# Patient Record
Sex: FEMALE | Race: BLACK OR AFRICAN AMERICAN | Employment: FULL TIME | URBAN - METROPOLITAN AREA
[De-identification: names, ages, dates, MRNs, and addresses within clinical notes are randomized per-mention and may not be internally consistent; named-entity substitution may affect disease eponyms.]

---
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
## 2021-07-26 ENCOUNTER — HOSPITAL ENCOUNTER (EMERGENCY)
Age: 21
Discharge: PSYCHIATRIC OR SUBSTANCE USE FACILITY | End: 2021-07-26
Attending: EMERGENCY MEDICINE
Payer: COMMERCIAL

## 2021-07-26 ENCOUNTER — HOSPITAL ENCOUNTER (OUTPATIENT)
Age: 21
Setting detail: OBSERVATION
Discharge: HOME | End: 2021-07-27
Attending: PSYCHIATRY & NEUROLOGY | Admitting: PSYCHIATRY & NEUROLOGY
Payer: COMMERCIAL

## 2021-07-26 VITALS
SYSTOLIC BLOOD PRESSURE: 109 MMHG | OXYGEN SATURATION: 100 % | TEMPERATURE: 98 F | RESPIRATION RATE: 16 BRPM | DIASTOLIC BLOOD PRESSURE: 56 MMHG | HEART RATE: 63 BPM

## 2021-07-26 DIAGNOSIS — R45.851 SUICIDAL IDEATION: Primary | ICD-10-CM

## 2021-07-26 PROBLEM — Z86.59 H/O MAJOR DEPRESSION: Status: ACTIVE | Noted: 2021-07-26

## 2021-07-26 LAB
ACETAMINOPHEN LEVEL: <5 UG/ML (ref 10–30)
AMPHETAMINE SCREEN, URINE: ABNORMAL
ANION GAP SERPL CALCULATED.3IONS-SCNC: 11 MMOL/L (ref 3–16)
BARBITURATE SCREEN URINE: ABNORMAL
BASOPHILS ABSOLUTE: 0 K/UL (ref 0–0.2)
BASOPHILS RELATIVE PERCENT: 0.4 %
BENZODIAZEPINE SCREEN, URINE: ABNORMAL
BILIRUBIN URINE: NEGATIVE
BLOOD, URINE: NEGATIVE
BUN BLDV-MCNC: 12 MG/DL (ref 7–20)
CALCIUM SERPL-MCNC: 9.3 MG/DL (ref 8.3–10.6)
CANNABINOID SCREEN URINE: POSITIVE
CHLORIDE BLD-SCNC: 105 MMOL/L (ref 99–110)
CLARITY: CLEAR
CO2: 23 MMOL/L (ref 21–32)
COCAINE METABOLITE SCREEN URINE: ABNORMAL
COLOR: YELLOW
CREAT SERPL-MCNC: 0.7 MG/DL (ref 0.6–1.1)
EOSINOPHILS ABSOLUTE: 0.2 K/UL (ref 0–0.6)
EOSINOPHILS RELATIVE PERCENT: 2.8 %
ETHANOL: NORMAL MG/DL (ref 0–0.08)
GFR AFRICAN AMERICAN: >60
GFR NON-AFRICAN AMERICAN: >60
GLUCOSE BLD-MCNC: 86 MG/DL (ref 70–99)
GLUCOSE URINE: NEGATIVE MG/DL
HCG QUALITATIVE: NEGATIVE
HCT VFR BLD CALC: 39.1 % (ref 36–48)
HEMOGLOBIN: 12.8 G/DL (ref 12–16)
KETONES, URINE: NEGATIVE MG/DL
LEUKOCYTE ESTERASE, URINE: NEGATIVE
LYMPHOCYTES ABSOLUTE: 1.5 K/UL (ref 1–5.1)
LYMPHOCYTES RELATIVE PERCENT: 26.1 %
Lab: ABNORMAL
MCH RBC QN AUTO: 27.5 PG (ref 26–34)
MCHC RBC AUTO-ENTMCNC: 32.8 G/DL (ref 31–36)
MCV RBC AUTO: 83.9 FL (ref 80–100)
METHADONE SCREEN, URINE: ABNORMAL
MICROSCOPIC EXAMINATION: NORMAL
MONOCYTES ABSOLUTE: 0.4 K/UL (ref 0–1.3)
MONOCYTES RELATIVE PERCENT: 6.6 %
NEUTROPHILS ABSOLUTE: 3.6 K/UL (ref 1.7–7.7)
NEUTROPHILS RELATIVE PERCENT: 64.1 %
NITRITE, URINE: NEGATIVE
OPIATE SCREEN URINE: ABNORMAL
OXYCODONE URINE: ABNORMAL
PDW BLD-RTO: 14.4 % (ref 12.4–15.4)
PH UA: 7
PH UA: 7 (ref 5–8)
PHENCYCLIDINE SCREEN URINE: ABNORMAL
PLATELET # BLD: 207 K/UL (ref 135–450)
PMV BLD AUTO: 8.4 FL (ref 5–10.5)
POTASSIUM REFLEX MAGNESIUM: 3.7 MMOL/L (ref 3.5–5.1)
PROPOXYPHENE SCREEN: ABNORMAL
PROTEIN UA: NEGATIVE MG/DL
RBC # BLD: 4.66 M/UL (ref 4–5.2)
SALICYLATE, SERUM: <0.3 MG/DL (ref 15–30)
SARS-COV-2, NAAT: NOT DETECTED
SODIUM BLD-SCNC: 139 MMOL/L (ref 136–145)
SPECIFIC GRAVITY UA: 1.02 (ref 1–1.03)
URINE TYPE: NORMAL
UROBILINOGEN, URINE: 1 E.U./DL
WBC # BLD: 5.6 K/UL (ref 4–11)

## 2021-07-26 PROCEDURE — 6370000000 HC RX 637 (ALT 250 FOR IP): Performed by: PSYCHIATRY & NEUROLOGY

## 2021-07-26 PROCEDURE — G0379 DIRECT REFER HOSPITAL OBSERV: HCPCS

## 2021-07-26 PROCEDURE — 1240000000 HC EMOTIONAL WELLNESS R&B

## 2021-07-26 PROCEDURE — G0378 HOSPITAL OBSERVATION PER HR: HCPCS

## 2021-07-26 PROCEDURE — 84703 CHORIONIC GONADOTROPIN ASSAY: CPT

## 2021-07-26 PROCEDURE — 81003 URINALYSIS AUTO W/O SCOPE: CPT

## 2021-07-26 PROCEDURE — 87635 SARS-COV-2 COVID-19 AMP PRB: CPT

## 2021-07-26 PROCEDURE — 80048 BASIC METABOLIC PNL TOTAL CA: CPT

## 2021-07-26 PROCEDURE — 82077 ASSAY SPEC XCP UR&BREATH IA: CPT

## 2021-07-26 PROCEDURE — 99285 EMERGENCY DEPT VISIT HI MDM: CPT

## 2021-07-26 PROCEDURE — 85025 COMPLETE CBC W/AUTO DIFF WBC: CPT

## 2021-07-26 PROCEDURE — 80307 DRUG TEST PRSMV CHEM ANLYZR: CPT

## 2021-07-26 PROCEDURE — 80143 DRUG ASSAY ACETAMINOPHEN: CPT

## 2021-07-26 PROCEDURE — 36415 COLL VENOUS BLD VENIPUNCTURE: CPT

## 2021-07-26 PROCEDURE — 80179 DRUG ASSAY SALICYLATE: CPT

## 2021-07-26 RX ORDER — ACETAMINOPHEN 325 MG/1
650 TABLET ORAL EVERY 4 HOURS PRN
Status: DISCONTINUED | OUTPATIENT
Start: 2021-07-26 | End: 2021-07-27 | Stop reason: HOSPADM

## 2021-07-26 RX ORDER — HYDROXYZINE PAMOATE 50 MG/1
50 CAPSULE ORAL EVERY 6 HOURS PRN
Status: DISCONTINUED | OUTPATIENT
Start: 2021-07-26 | End: 2021-07-27 | Stop reason: HOSPADM

## 2021-07-26 RX ORDER — MAGNESIUM HYDROXIDE/ALUMINUM HYDROXICE/SIMETHICONE 120; 1200; 1200 MG/30ML; MG/30ML; MG/30ML
30 SUSPENSION ORAL EVERY 6 HOURS PRN
Status: DISCONTINUED | OUTPATIENT
Start: 2021-07-26 | End: 2021-07-27 | Stop reason: HOSPADM

## 2021-07-26 RX ORDER — OLANZAPINE 10 MG/1
10 TABLET ORAL
Status: DISCONTINUED | OUTPATIENT
Start: 2021-07-27 | End: 2021-07-27 | Stop reason: HOSPADM

## 2021-07-26 RX ORDER — IBUPROFEN 400 MG/1
400 TABLET ORAL EVERY 6 HOURS PRN
Status: DISCONTINUED | OUTPATIENT
Start: 2021-07-26 | End: 2021-07-27 | Stop reason: HOSPADM

## 2021-07-26 RX ORDER — TRAZODONE HYDROCHLORIDE 50 MG/1
50 TABLET ORAL NIGHTLY PRN
Status: DISCONTINUED | OUTPATIENT
Start: 2021-07-26 | End: 2021-07-27 | Stop reason: HOSPADM

## 2021-07-26 RX ORDER — OLANZAPINE 10 MG/1
10 INJECTION, POWDER, LYOPHILIZED, FOR SOLUTION INTRAMUSCULAR
Status: DISCONTINUED | OUTPATIENT
Start: 2021-07-27 | End: 2021-07-27 | Stop reason: HOSPADM

## 2021-07-26 RX ORDER — BENZTROPINE MESYLATE 1 MG/ML
2 INJECTION INTRAMUSCULAR; INTRAVENOUS 2 TIMES DAILY PRN
Status: DISCONTINUED | OUTPATIENT
Start: 2021-07-26 | End: 2021-07-27 | Stop reason: HOSPADM

## 2021-07-26 RX ADMIN — TRAZODONE HYDROCHLORIDE 50 MG: 50 TABLET ORAL at 23:33

## 2021-07-26 NOTE — ED PROVIDER NOTES
[address] [facility] [address] ENCOUNTER          PHYSICIAN ASSISTANT NOTE       Date of evaluation: 7/26/2021    Chief Complaint     Suicidal (pt states, \"I tried to kill myself today. I tried to cut my arms open with a  knife. \" Pt states, \"Im not happy. Its just stuff after stuff and Im very emotional.\" Pt states. \"2 years ago I tried to kill myself the same way and it didnt work either. \")      History of Present Illness     Javier Diego is a 24 y.o. female with past medical history significant for anxiety who presents following suicide attempt. Patient states that she has struggled with similar symptoms in the past, was previously seeing a therapist at [facility], but has not been to them in many years. States that she has had similar episode in the past, which she attempted to cut herself with a  knife. States that over the past few days she has had increasing stress, frequent episodes of crying, and today she got a knife and cut her left arm. Unsure of tetanus status. States that if she left here today she does feel that she would attempt to harm herself again. Does live at home with her mom and stepdad, and occasionally stays with her boyfriend, who are all very supportive. Does not take any antidepressants or anxiety medications. Denies any hallucinations. Use marijuana and intermittently drinks alcohol, but denies any other drug use. Review of Systems     Review of Systems   See HPI, all others negative. Past Medical, Surgical, Family, and Social History     She has a past medical history of Anxiety. She has no past surgical history on file. Her family history is not on file. She reports that she has never smoked. She has never used smokeless tobacco. She reports current alcohol use. She reports current drug use. Drug: Marijuana. Medications     Previous Medications    No medications on file       Allergies     She has No Known Allergies.     Physical Exam INITIAL VITALS: BP: (!) 146/111, Temp: 98 °F (36.7 °C), Pulse: 101, Resp: 19, SpO2: 100 %  Physical Exam  Constitutional:       General: She is not in acute distress. Appearance: Normal appearance. She is not ill-appearing, toxic-appearing or diaphoretic. HENT:      Head: Normocephalic and atraumatic. Nose: Nose normal.      Mouth/Throat:      Mouth: Mucous membranes are moist.   Eyes:      Conjunctiva/sclera: Conjunctivae normal.   Cardiovascular:      Rate and Rhythm: Normal rate. Pulmonary:      Effort: Pulmonary effort is normal. No respiratory distress. Abdominal:      Palpations: Abdomen is soft. Musculoskeletal:         General: Normal range of motion. Skin:     General: Skin is warm and dry. Comments: Multiple superficial lacerations to left forearm. No active bleeding. Neurological:      General: No focal deficit present. Mental Status: She is alert and oriented to person, place, and time. Psychiatric:      Comments: Tearful throughout entire conversation. Endorses suicidal ideation. No obvious response to internal stimuli. Denies hallucinations. Denies HI.           Diagnostic Results     RADIOLOGY:  No orders to display       LABS:   Results for orders placed or performed during the hospital encounter of 07/26/21   CBC Auto Differential   Result Value Ref Range    WBC 5.6 4.0 - 11.0 K/uL    RBC 4.66 4.00 - 5.20 M/uL    Hemoglobin 12.8 12.0 - 16.0 g/dL    Hematocrit 39.1 36.0 - 48.0 %    MCV 83.9 80.0 - 100.0 fL    MCH 27.5 26.0 - 34.0 pg    MCHC 32.8 31.0 - 36.0 g/dL    RDW 14.4 12.4 - 15.4 %    Platelets 682 431 - 364 K/uL    MPV 8.4 5.0 - 10.5 fL    Neutrophils % 64.1 %    Lymphocytes % 26.1 %    Monocytes % 6.6 %    Eosinophils % 2.8 %    Basophils % 0.4 %    Neutrophils Absolute 3.6 1.7 - 7.7 K/uL    Lymphocytes Absolute 1.5 1.0 - 5.1 K/uL    Monocytes Absolute 0.4 0.0 - 1.3 K/uL    Eosinophils Absolute 0.2 0.0 - 0.6 K/uL    Basophils Absolute 0.0 0.0 - 0.2 K/uL   Urinalysis, reflex to microscopic (Lab)   Result Value Ref Range    Color, UA Yellow Straw/Yellow    Clarity, UA Clear Clear    Glucose, Ur Negative Negative mg/dL    Bilirubin Urine Negative Negative    Ketones, Urine Negative Negative mg/dL    Specific Gravity, UA 1.020 1.005 - 1.030    Blood, Urine Negative Negative    pH, UA 7.0 5.0 - 8.0    Protein, UA Negative Negative mg/dL    Urobilinogen, Urine 1.0 <2.0 E.U./dL    Nitrite, Urine Negative Negative    Leukocyte Esterase, Urine Negative Negative    Microscopic Examination Not Indicated     Urine Type NotGiven    Urine Drug Screen   Result Value Ref Range    pH, UA 7.0     Drug Screen Comment: see below          RECENT VITALS:  BP: (!) 146/111, Temp: 98 °F (36.7 °C), Pulse: 101, Resp: 19, SpO2: 100 %     Procedures       ED Course     Nursing Notes, Past Medical Hx,Past Surgical Hx, Social Hx, Allergies, and Family Hx were reviewed. The patient was given the following medications:  No orders of the defined types were placed in this encounter. CONSULTS:  None    MEDICAL DECISION MAKING / ASSESSMENT / PLAN     Jorge Luis Campbell is a 24 y.o. female presenting for suicidal ideation. Patient does have some superficial lacerations noted to left forearm with no active bleeding and none requiring repair. Per chart review, tetanus is up-to-date not due until 2025. Patient does appear depressed and is tearful throughout entire exam.  Patient does state that she would likely attempt self-harm if she left here today. Psychiatric hold was signed. CBC and BMP resulted within normal limits. Pregnancy test negative. UDS positive for cannabis only. Ethanol, salicylate, and acetaminophen levels all negative. At this time, we feel patient is medically stable and cleared for transfer to inpatient psychiatric facility for ongoing suicidal ideation. This patient was also evaluated by the attending physician.  All care plans were discussed and agreed upon.    Clinical Impression     1. Suicidal ideation        Disposition     PATIENT REFERRED TO:  No follow-up provider specified.     DISCHARGE MEDICATIONS:  New Prescriptions    No medications on file       [address] Tati Pompa PA-C  07/26/21 0384

## 2021-07-26 NOTE — ED PROVIDER NOTES
ED Attending Attestation Note     Date of evaluation: 7/26/2021    This patient was seen by the CONNIE. I have seen and examined the patient, agree with the workup, evaluation, management and diagnosis. The care plan has been discussed. I was present for any procedures performed in the CONNIE's note and have made edits to the note where appropriate. My assessment reveals 24 y.o. female with history of depression presenting to the emergency department today with worsening depression and suicidal ideation. Attempted to cut her left forearm, has some minor abrasions, but nothing that requires reapproximation or repair. No other evidence of physical self-harm. No clinical toxidrome. Continues to have suicidal ideation, will place patient on a 72-hour psychiatric hold and attempt to find further psychiatric care.        Dane Lima MD  07/26/21 3912

## 2021-07-26 NOTE — ED NOTES
Assumed care from previous RN, with Navneet Mccord. Patient's visitor was asked to go to the lobby and patient was put into a paper gown. All cords and unsecured items were removed from room for patient and staff safety.       Henrique Hawley RN  07/26/21 2209

## 2021-07-27 VITALS
HEART RATE: 76 BPM | TEMPERATURE: 97.2 F | OXYGEN SATURATION: 99 % | HEIGHT: 67 IN | DIASTOLIC BLOOD PRESSURE: 86 MMHG | SYSTOLIC BLOOD PRESSURE: 121 MMHG | BODY MASS INDEX: 39.55 KG/M2 | RESPIRATION RATE: 16 BRPM | WEIGHT: 252 LBS

## 2021-07-27 PROBLEM — F33.9 MAJOR DEPRESSION, RECURRENT (HCC): Status: ACTIVE | Noted: 2021-07-27

## 2021-07-27 PROBLEM — F33.2 SEVERE EPISODE OF RECURRENT MAJOR DEPRESSIVE DISORDER, WITHOUT PSYCHOTIC FEATURES (HCC): Status: ACTIVE | Noted: 2021-07-27

## 2021-07-27 LAB
CHOLESTEROL, TOTAL: 173 MG/DL (ref 0–199)
HDLC SERPL-MCNC: 63 MG/DL (ref 40–60)
LDL CHOLESTEROL CALCULATED: 101 MG/DL
TRIGL SERPL-MCNC: 45 MG/DL (ref 0–150)
TSH SERPL DL<=0.05 MIU/L-ACNC: 0.96 UIU/ML (ref 0.43–4)
VLDLC SERPL CALC-MCNC: 9 MG/DL

## 2021-07-27 PROCEDURE — 36415 COLL VENOUS BLD VENIPUNCTURE: CPT

## 2021-07-27 PROCEDURE — 80061 LIPID PANEL: CPT

## 2021-07-27 PROCEDURE — 84443 ASSAY THYROID STIM HORMONE: CPT

## 2021-07-27 PROCEDURE — 5130000000 HC BRIDGE APPOINTMENT

## 2021-07-27 PROCEDURE — G0378 HOSPITAL OBSERVATION PER HR: HCPCS

## 2021-07-27 PROCEDURE — 83036 HEMOGLOBIN GLYCOSYLATED A1C: CPT

## 2021-07-27 PROCEDURE — 99220 PR INITIAL OBSERVATION CARE/DAY 70 MINUTES: CPT | Performed by: PSYCHIATRY & NEUROLOGY

## 2021-07-27 RX ORDER — CEFDINIR 300 MG/1
300 CAPSULE ORAL 2 TIMES DAILY
Qty: 14 CAPSULE | Refills: 0 | Status: SHIPPED | OUTPATIENT
Start: 2021-07-27 | End: 2021-08-03

## 2021-07-27 ASSESSMENT — PAIN SCALES - GENERAL
PAINLEVEL_OUTOF10: 0
PAINLEVEL_OUTOF10: 0

## 2021-07-27 ASSESSMENT — SLEEP AND FATIGUE QUESTIONNAIRES
DO YOU USE A SLEEP AID: NO
DIFFICULTY STAYING ASLEEP: YES
AVERAGE NUMBER OF SLEEP HOURS: 7
DO YOU USE A SLEEP AID: NO
DIFFICULTY ARISING: YES
DO YOU HAVE DIFFICULTY SLEEPING: NO
DO YOU HAVE DIFFICULTY SLEEPING: NO
DIFFICULTY FALLING ASLEEP: YES
AVERAGE NUMBER OF SLEEP HOURS: 5
RESTFUL SLEEP: NO

## 2021-07-27 ASSESSMENT — LIFESTYLE VARIABLES
HISTORY_ALCOHOL_USE: NO
HISTORY_ALCOHOL_USE: NO

## 2021-07-27 NOTE — PLAN OF CARE
Problem: Altered Mood, Depressive Behavior:  Goal: Able to verbalize and/or display a decrease in depressive symptoms  Description: Able to verbalize and/or display a decrease in depressive symptoms  Outcome: Ongoing     Problem: Altered Mood, Depressive Behavior:  Goal: Ability to disclose and discuss suicidal ideas will improve  Description: Ability to disclose and discuss suicidal ideas will improve  Outcome: Ongoing   Patient has been awake and visible out on the unit since the start of shift. She is A&Ox4 and denies that she is having thoughts to harm self or others. She does not feel that she should be here that she was feeling overwhelmed yesterday and cut on herself. She reports that it was impulsive and feels that she actually needs to get back into therapy to help with her mood problems. She has a history of being connected with [facility] as a child and would like to get reconnected there. She denies that she is experiencing hallucinations. She did not make any delusional statements while interacting with staff. She reports having good support from mom and boyfriend.

## 2021-07-27 NOTE — SUICIDE SAFETY PLAN
SAFETY PLAN    A suicide Safety Plan is a document that supports someone when they are having thoughts of suicide. Warning Signs that indicate a suicidal crisis may be developing: What (situations, thoughts, feelings, body sensations, behaviors, etc.) do you experience that lets you know you are beginning to think about suicide? 1. thoughts  2. Mood changes and behavior    Internal Coping Strategies:  What things can I do (relaxation techniques, hobbies, physical activities, etc.) to take my mind off my problems without contacting another person? 1. Holiness  2. AA/NA  3. hobbies    People and social settings that provide distraction: Who can I call or where can I go to distract me? 1. Name: [address] whom I can ask for help: Who can I call when I need help - for example, friends, family, clergy, someone else? 1. Name: Bradly Huggins or [address] [place], [place] [address]vd I can contact during a crisis: Who can I call for help - for example, my doctor, my psychiatrist, my psychologist, a mental health provider, a suicide hotline? 1. Clinician Name: [address] [place] [address]   Address: [address] [place], [facility]      Phone  #: [phone]    2. Suicide Prevention Lifeline: 5-274-848-ORIC (2797)    Making the environment safe: How can I make my environment (house/apartment/living space) safer? For example, can I remove guns, medications, and other items? 1. Avoid using drugs and alcohol    Something that is important to me and worth living for is: my family.

## 2021-07-27 NOTE — FLOWSHEET NOTE
07/27/21 1153   Activities of Daily Living   Patient Requires assistance with daily self-care activities?  No   Leisure Activity 1   3 Favorite Leisure Activities listen to music   Frequency > 2 hours/day   Last time this week   Barriers to participating  financial/money   Leisure Activity 2   [address]  watch movies   Frequency  weekly   Last time  can't remember   Barriers to participating  financial/money   Leisure Activity 3   [address]  time outdoors   Barriers to participating  financial/money;motivation   Social   Patient reports spending the majority of their free time with one other person   Patient verbalizes a preference for spending free time with one other person   Patients perception of support system healthy/strong   Patients perception of barriers to socializing with others include(s) transportation;finances   Social Details Parents live downstairs, boyfriend is live-in half the week   Beliefs & Coping   Has difficulty dealing with feelings   Yes   Internalizes feelings/Keeps feelings in Yes   Externalizes feelings through aggressiveness or poor temper control  No   Feels uncomfortable around others  Yes   Has difficulty talking to others  Yes   Depends on others for direction or decisions Yes   Difficulty dealing with anger of others  Yes   Difficulty dealing with own anger  Yes   Difficulty managing stress Yes   Frequently has difficulty with relationships  Yes   which,who,where with friends/peers;in family   Has recently perceived/experienced loss, disappointment, humiliation or failure  Yes   General perception about self does not like self   Attitude about abilities occasionally fails   Locus of Control  sometimes   Belief about recovery Recovery is possible   Patient Identified Strengths  cheerful, positive, encouraging   Patient Identified Limitations  financial, transportation, movitation   Perception of most stressful event prior to hospitalization \"I was told that I'm not doing enough to be healthy. IT took me to my breaking point. \"   Perception of changes needed emotional control   Strengths and Limitations   Strengths Independent in basic self-care activities;Demonstrates basic social skills   Limitations General negative or hopeless attitude about future/recovery     This writer met with pt 1:1 to complete AT/OT assessment.     Completed by Serafin bOando, MM, MT-BC

## 2021-07-27 NOTE — GROUP NOTE
Group Therapy Note    Date: 7/27/2021    Group Start Time: 11:15 AM  Group End Time: 12:15 PM  Group Topic: Recovery    [facility]    Viviana Junior, [facility]        Group Therapy Note    Attendees: 6         Will discuss how perspective change influences improved mood and outlook and identify how to apply to life circumstances. Notes:  Patient attended group. Completed the worksheet and discussed. Patient verbalized an understanding of the topic and provided 3 examples of how she could look at life differently. Status After Intervention:  Improved    Participation Level:  Active Listener and Interactive    Participation Quality: Appropriate, Attentive, Sharing and Supportive    Speech:  normal    Thought Process/Content: Logical  Linear    Affective Functioning: Congruent    Mood: anxious and depressed    Level of consciousness:  Oriented x4    Response to Learning: Able to verbalize current knowledge/experience and Capable of insight    Endings: None Reported    Modes of Intervention: Education, Support, Socialization and Exploration    Discipline Responsible: /Counselor     Signature:  Viviana Junior, [facility]

## 2021-07-27 NOTE — ED NOTES
Mother's contact information: [phone]  Name: Cristino Whyte. Please update as needed.      Sixto Vaz RN  07/26/21 6615

## 2021-07-27 NOTE — FLOWSHEET NOTE
Purposeful Rounding    Patient Location Day room    Patient willing to engage in conversationYes    Presentation/behavior Cooperative    Affect Neutral/Euthymic(normal)    Concerns reported: wanting to leave    PRN medications given:no    Environmental assessment Room free from clutter, Clear path to bathroom  and Adequate lighting    Fall prevention interventions in place: Yellow non-skid socks on    Daily [place] Fall Risk Score :55    Daily Badillo Fall Risk Score : low

## 2021-07-27 NOTE — GROUP NOTE
Group Therapy Note    Date: 7/27/2021    Group Start Time: 1110  Group End Time: 1150  Group Topic: Psychotherapy    [facility]    Janae Gimenez, [facility]        Group Therapy Note    Attendees: 5         Patient's Goal:  Pt's goal was to express her emotions appropriately. Notes:  Pt was engaged in group. Pt shared, gave feedback and support. Pt met goal.     Status After Intervention:  Improved    Participation Level:  Active Listener and Interactive    Participation Quality: Appropriate, Attentive, Sharing and Supportive      Speech:  normal      Thought Process/Content: Logical  Linear      Affective Functioning: Congruent      Mood: euthymic      Level of consciousness:  Alert, Oriented x4 and Attentive      Response to Learning: Able to verbalize current knowledge/experience, Able to verbalize/acknowledge new learning, Able to retain information, Capable of insight and Progressing to goal      Endings: None Reported    Modes of Intervention: Education, Support, Socialization, Exploration, Clarifying, Problem-solving, Activity, Movement, Confrontation, Limit-setting and Reality-testing      Discipline Responsible: /Counselor      Signature:  Janae Gimenez, [facility]

## 2021-07-27 NOTE — FLOWSHEET NOTE
Purposeful Rounding    Patient Location Day room    Patient willing to engage in conversationYes    Presentation/behavior Cooperative and Pleasant    Affect Neutral/Euthymic(normal)    Concerns reported: none    PRN medications given:no    Environmental assessment Room free from clutter, Clear path to bathroom  and Adequate lighting    Fall prevention interventions in place: Yellow non-skid socks on    Daily [place] Fall Risk Score :55    Daily Badillo Fall Risk Score : low

## 2021-07-27 NOTE — FLOWSHEET NOTE
07/27/21 1331   Psychiatric History   Are there any medication issues? No   Support System   Support system Primary support persons   Types of Support System Spouse; Father; Mother   Problems in support system Alienated/estranged; Lack of friends/family   Current Living Situation   Home Living Adequate   Living information Lives with others   Problems with living situation  No   Lack of basic needs No   Other government assistance none   Problems with environment none   Current abuse issues none   Supervised setting None   Relationship problems No   Contact information none   Medical and Self-Care Issues   Relevant medical problems denies   Relevant self-care issues denies   Barriers to treatment No   Family Constellation   Children-names/ages none   Parents [facility] [phone]   Siblings Chelsea Immanuel, Frankey Canon   Comment none   Childhood   Raised by Biological mother;Biological father   Relevant family history denies   History of abuse No   Legal History   Legal history Yes   Current charges No   Pick-up order  No   Restraining order No   Sentence pending No   Domestic violence charges No   Homicidal threats or behaviors No   Duty to warn No   Probation/parole No   Other relevant legal issues Disorderly Conduct for a theft at [address] legal history No    Abuse Assessment   Physical Abuse Denies   Verbal Abuse Denies   Emotional abuse Denies   Financial Abuse Denies   Sexual abuse Denies   Elder abuse No   Substance Use   Use of substances  No   Motivation for SA Treatment   Stage of engagement Pre-engagement/engagement   Education   Education HS graduate -GED   Work History   Currently employed Yes   /VA involvement no   Leisure/Activity   Past interests movies, music, role play   Present interests movies, tv, music   Current daily activity work and time with bf   Social with friends/family No   Cultural and Spiritual   Spiritual concerns No   Cultural concerns No   Completed psychosocial assessment. Patient reports that she was upset and cut herself in an attempt to die. Has a prior attempt 2 years ago. Feels disconnected and overwhelmed. No avh. Denies s/h ideations at this time.

## 2021-07-27 NOTE — PROGRESS NOTES
Behavioral Services  Medicare Certification Upon Admission    I certify that this patient's inpatient psychiatric hospital admission is medically necessary for:    [x] (1) Treatment which could reasonably be expected to improve this patient's condition,       [x] (2) Or for diagnostic study;     AND     [x](2) The inpatient psychiatric services are provided while the individual is under the care of a physician and are included in the individualized plan of care.     Estimated length of stay/service 1-2  d    Plan for post-hospital care outpt    Electronically signed by Keren Choe MD on 7/27/2021 at 12:14 PM

## 2021-07-27 NOTE — BH NOTE
[facility]  Admission Note     Admission Type:   Admission Type:  Involuntary    Reason for admission:  Reason for Admission: Suicide attempt    PATIENT STRENGTHS:  Strengths: No significant Physical Illness    Patient Strengths and Limitations:  Limitations: General negative or hopeless attitude about future/recovery    Addictive Behavior:   Addictive Behavior  In the past 3 months, have you felt or has someone told you that you have a problem with:  : None  Do you have a history of Chemical Use?: No  Do you have a history of Alcohol Use?: No  Do you have a history of Street Drug Abuse?: No  Histroy of Prescripton Drug Abuse?: No    Medical Problems:   Past Medical History:   Diagnosis Date    Anxiety     states when she was younger       Status EXAM:  Status and Exam  Normal: No  Facial Expression: Sad  Affect: Congruent  Level of Consciousness: Alert  Mood:Normal: No  Mood: Sad, Depressed  Motor Activity:Normal: No  Motor Activity: Decreased  Interview Behavior: Cooperative  Preception: [place] to Person, [place] to Time, [place] to Place, [place] to Situation  Attention:Normal: Yes  Thought Content:Normal: Yes  Hallucinations: None  Delusions: No  Memory:Normal: Yes  Insight and Judgment: No  Insight and Judgment: Poor Judgment  Present Suicidal Ideation: No  Present Homicidal Ideation: No    Tobacco Screening:  Practical Counseling, on admission, kalyani X, if applicable and completed (first 3 are required if patient doesn't refuse):            ( )  Recognizing danger situations (included triggers and roadblocks)                    ( )  Coping skills (new ways to manage stress, exercise, relaxation techniques, changing routine, distraction)                                                           ( )  Basic information about quitting (benefits of quitting, techniques in how to quit, available resources  ( ) Referral for counseling faxed to [facility] ( ) Patient refused counseling  ( ) Patient has not smoked in the last 30 days    Metabolic Screening:    No results found for: LABA1C    No results found for: CHOL  No results found for: TRIG  No results found for: HDL  No components found for: LDLCAL  No results found for: LABVLDL      Body mass index is 39.76 kg/m². BP Readings from Last 2 Encounters:   07/27/21 127/76   07/26/21 (!) 109/56           Pt admitted with followings belongings:  Dentures: None  Vision - Corrective Lenses: None  Hearing Aid: None  Jewelry: Earrings (nose rings)  Body Piercings Removed: No  Clothing: Footwear, Pants, Shirt  Were All Patient Medications Collected?: Not Applicable  Other Valuables: Cell phone, Other (Comment) (phone )     Patient's home medications were n/a. Patient oriented to surroundings and program expectations and copy of patient rights given. Received admission packet:  yes. Consents reviewed, signed yes. Refused voluntary. Patient verbalize understanding:  yes.   Patient education on precautions: yes                   Yu Moyer RN

## 2021-07-27 NOTE — ED NOTES
Report called to [facility] for ED5 going to [address] [place] [address]. All questions answered. Patient will be transported on ED stretcher via [facility]. Patient left on cot with straps and belongings.         Gerson Mckeon RN  07/26/21 [address] [facility], RN  07/26/21 6935

## 2021-07-27 NOTE — CARE COORDINATION
[address] [facility]  Discharge Note    Pt discharged with followings belongings:   Dentures: None  Vision - Corrective Lenses: None  Hearing Aid: None  Jewelry: Earrings (nose rings)  Body Piercings Removed: No  Clothing: Footwear, Pants, Shirt  Were All Patient Medications Collected?: Not Applicable  Other Valuables: Cell phone, Other (Comment) (phone )   Valuables sent home with patient or returned to patient. Patient education on aftercare instructions: yes. Information faxed to N/A by this writer  at 2:30 PM .Patient verbalize understanding of AVS:  yes. Status EXAM upon discharge:  Status and Exam  Normal: No  Facial Expression: Worried  Affect: Stable  Level of Consciousness: Alert  Mood:Normal: Yes  Mood: Other (Comment) (Euthymic)  Motor Activity:Normal: Yes  Motor Activity: Decreased  Interview Behavior: Cooperative  Preception: [place] to Person, Particia Moh to Time, [place] to Place, [place] to Situation  Attention:Normal: Yes  Thought Processes: Circumstantial  Thought Content:Normal: Yes  Hallucinations: None  Delusions: No  Memory:Normal: Yes  Insight and Judgment: Yes  Insight and Judgment: Poor Judgment, Poor Insight  Present Suicidal Ideation: No  Present Homicidal Ideation: No      Metabolic Screening:    No results found for: LABA1C    No results found for: CHOL  No results found for: TRIG  No results found for: HDL  No components found for: LDLCAL  No results found for: Kevin Valentin RN    Bridge Appointment completed: Reviewed Discharge Instructions with patient. Patient verbalizes understanding and agreement with the discharge plan using the teachback method.      Referral for Outpatient Tobacco Cessation Counseling, upon discharge (kalyani X if applicable and completed):    ( )  Hospital staff assisted patient to call Quit Line or faxed referral                                   during hospitalization                  (X)  Recognizing danger situations (included

## 2021-07-27 NOTE — SUICIDE SAFETY PLAN
SAFETY PLAN    A suicide Safety Plan is a document that supports someone when they are having thoughts of suicide. Warning Signs that indicate a suicidal crisis may be developing: What (situations, thoughts, feelings, body sensations, behaviors, etc.) do you experience that lets you know you are beginning to think about suicide? 1. crying  2. frustration    Internal Coping Strategies:  What things can I do (relaxation techniques, hobbies, physical activities, etc.) to take my mind off my problems without contacting another person? 1. Breathing excercises  2. Taking a walk    People and social settings that provide distraction: Who can I call or where can I go to distract me? 1. Name: Heri Kirk (boyfriend)  Phone: [phone]  2. Name: Cousin/Mom     3. Place: [place] whom I can ask for help: Who can I call when I need help - for example, friends, family, clergy, someone else? 1. Name: Heri Kirk (boyfriend)  Phone: [phone]  2. Name: Mom Sophia Ruiz    3. Name: Yung Hawthorne  Phone: [phone]    Professionals or [address] [place] [address] I can contact during a crisis: Who can I call for help - for example, my doctor, my psychiatrist, my psychologist, a mental health provider, a suicide hotline? 1. Clinician Name: [address] [place] [address]   Address: [address] [place] [facility]      Phone  #: [phone]    2. Suicide Prevention Lifeline: 4-910-022-TALK (5269)    Making the environment safe: How can I make my environment (house/apartment/living space) safer? For example, can I remove guns, medications, and other items? 1. Not being home alone    Something that is important to me and worth living for is: my family.

## 2021-07-27 NOTE — H&P
Hospital Medicine History & Physical      PCP: No primary care provider on file. Date of Admission: 7/26/2021    Date of Service: Pt seen/examined on 7/27/2021    Chief Complaint:  SI  History Of Present Illness: The patient is a 24 y.o. female with who presented to [facility] ED with complaint of SI. Patient was seen and evaluated in the ED by the ED medical provider, patient was medically cleared for admission to [facility]. This note serves as an admission medical H&P.    PCP: No primary care provider on file. Past Medical History:        Diagnosis Date    Anxiety     states when she was younger       Past Surgical History:    History reviewed. No pertinent surgical history. Medications Prior to Admission:    Prior to Admission medications    Not on File       Allergies:  Patient has no known allergies. Social History:  The patient currently lives home. TOBACCO:   reports that she has never smoked. She has never used smokeless tobacco.  ETOH:   reports current alcohol use. Family History:   Positive as follows:    History reviewed. No pertinent family history.     REVIEW OF SYSTEMS:     Limited ROS    HENT: + L ear pain       PHYSICAL EXAM:  /86   Pulse 76   Temp 97.2 °F (36.2 °C) (Temporal)   Resp 16   Ht 5' 6.75\" (1.695 m)   Wt 252 lb (114.3 kg)   LMP 07/12/2021 (Approximate)   SpO2 99%   BMI 39.76 kg/m²      + erythema to the L TM without canal edema, + Keloid to the left pinna     CBC:   Recent Labs     07/26/21  1332   WBC 5.6   HGB 12.8   HCT 39.1   MCV 83.9        BMP:   Recent Labs     07/26/21  1332      K 3.7      CO2 23   BUN 12   CREATININE 0.7   UA:  Recent Labs     07/26/21  1335   COLORU Yellow   PHUR 7.0  7.0   CLARITYU Clear   SPECGRAV 1.020   LEUKOCYTESUR Negative   UROBILINOGEN 1.0   BILIRUBINUR Negative   BLOODU Negative   GLUCOSEU Negative     CULTURES  -COVID-19- not detected    EKG:  I have reviewed the EKG with the following interpretation:   None    RADIOLOGY  No orders to display       Pertinent previous results reviewed   none    ASSESSMENT/PLAN:  H/o major depression  - per psychiatry team    L otitis Media  - had been started on Amoxicillin but not tolerating well, only on this for 48 hours   - will send Rx for WARD ESQUEDA and patient will follow up with PCP     Limited exam as patient was discharged prior to my H&P and I quickly evaluated the patients ear as she was leaving the floor     Hiral Li PA-C  7/27/2021 1:10 PM

## 2021-07-27 NOTE — BH NOTE
2333 Pt given Trazodone 50 mg po for sleep. Pt slightly tearful. States has never been on a MH unit before.

## 2021-07-28 LAB
ESTIMATED AVERAGE GLUCOSE: 111.2 MG/DL
HBA1C MFR BLD: 5.5 %
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
## 2021-09-08 ENCOUNTER — HOSPITAL ENCOUNTER (EMERGENCY)
Age: 21
Discharge: LEFT BEFORE DISCHARGE | End: 2021-09-08
Attending: EMERGENCY MEDICINE
Payer: COMMERCIAL

## 2022-06-27 ENCOUNTER — HOSPITAL ENCOUNTER (EMERGENCY)
Age: 22
Discharge: HOME | End: 2022-06-27
Attending: EMERGENCY MEDICINE

## 2022-06-27 VITALS
SYSTOLIC BLOOD PRESSURE: 110 MMHG | RESPIRATION RATE: 16 BRPM | HEIGHT: 67 IN | HEART RATE: 63 BPM | DIASTOLIC BLOOD PRESSURE: 67 MMHG | TEMPERATURE: 98.4 F | OXYGEN SATURATION: 97 % | WEIGHT: 254.5 LBS | BODY MASS INDEX: 39.94 KG/M2

## 2022-06-27 DIAGNOSIS — N93.9 VAGINAL BLEEDING: ICD-10-CM

## 2022-06-27 DIAGNOSIS — B96.89 BACTERIAL VAGINOSIS: Primary | ICD-10-CM

## 2022-06-27 DIAGNOSIS — N89.8 VAGINAL ITCHING: ICD-10-CM

## 2022-06-27 DIAGNOSIS — N76.0 BACTERIAL VAGINOSIS: Primary | ICD-10-CM

## 2022-06-27 LAB
AMORPHOUS: ABNORMAL /HPF
BACTERIA WET PREP: ABNORMAL
BACTERIA: ABNORMAL /HPF
BILIRUBIN URINE: NEGATIVE
BLOOD, URINE: ABNORMAL
CLARITY: ABNORMAL
CLUE CELLS: ABNORMAL
COLOR: YELLOW
EPITHELIAL CELLS WET PREP: ABNORMAL
EPITHELIAL CELLS, UA: ABNORMAL /HPF (ref 0–5)
GLUCOSE URINE: NEGATIVE MG/DL
HCG(URINE) PREGNANCY TEST: NEGATIVE
KETONES, URINE: NEGATIVE MG/DL
LEUKOCYTE ESTERASE, URINE: NEGATIVE
MICROSCOPIC EXAMINATION: YES
NITRITE, URINE: NEGATIVE
PH UA: 7 (ref 5–8)
PROTEIN UA: NEGATIVE MG/DL
RBC UA: ABNORMAL /HPF (ref 0–4)
RBC WET PREP: ABNORMAL
SOURCE WET PREP: ABNORMAL
SPECIFIC GRAVITY UA: 1.02 (ref 1–1.03)
TRICHOMONAS PREP: ABNORMAL
URINE TYPE: ABNORMAL
UROBILINOGEN, URINE: 0.2 E.U./DL
WBC UA: ABNORMAL /HPF (ref 0–5)
WBC WET PREP: ABNORMAL
YEAST WET PREP: ABNORMAL

## 2022-06-27 PROCEDURE — 6370000000 HC RX 637 (ALT 250 FOR IP): Performed by: EMERGENCY MEDICINE

## 2022-06-27 PROCEDURE — 84703 CHORIONIC GONADOTROPIN ASSAY: CPT

## 2022-06-27 PROCEDURE — 87491 CHLMYD TRACH DNA AMP PROBE: CPT

## 2022-06-27 PROCEDURE — 99283 EMERGENCY DEPT VISIT LOW MDM: CPT

## 2022-06-27 PROCEDURE — 87210 SMEAR WET MOUNT SALINE/INK: CPT

## 2022-06-27 PROCEDURE — 81001 URINALYSIS AUTO W/SCOPE: CPT

## 2022-06-27 PROCEDURE — 87591 N.GONORRHOEAE DNA AMP PROB: CPT

## 2022-06-27 RX ORDER — FLUCONAZOLE 150 MG/1
150 TABLET ORAL ONCE
Status: COMPLETED | OUTPATIENT
Start: 2022-06-27 | End: 2022-06-27

## 2022-06-27 RX ORDER — METRONIDAZOLE 500 MG/1
500 TABLET ORAL 2 TIMES DAILY
Qty: 14 TABLET | Refills: 0 | Status: SHIPPED | OUTPATIENT
Start: 2022-06-27 | End: 2022-07-04

## 2022-06-27 RX ADMIN — FLUCONAZOLE 150 MG: 150 TABLET ORAL at 13:17

## 2022-06-27 ASSESSMENT — PAIN - FUNCTIONAL ASSESSMENT: PAIN_FUNCTIONAL_ASSESSMENT: NONE - DENIES PAIN

## 2022-06-27 NOTE — Clinical Note
Michael Canales was seen and treated in our emergency department on 6/27/2022. She may return to work on 06/28/2022. If you have any questions or concerns, please don't hesitate to call.       Arlyn Covarrubias MD

## 2022-06-27 NOTE — ED PROVIDER NOTES
[address]        Pt Name: Derick Villeda  MRN: [id number]  [place] 2000  Date of evaluation: 6/27/2022  Provider: Shayna Gonsalez MD  PCP: No primary care provider on file. CHIEF COMPLAINT       Chief Complaint   Patient presents with    Yeast Infection       HISTORY OFPRESENT ILLNESS   (Location/Symptom, Timing/Onset, Context/Setting, Quality, Duration, Modifying Factors,Severity)  Note limiting factors. Derick Villeda is a 22 y.o. female presenting today due to concern for possible yeast infection over the last few weeks but feels like it has worsened over the last couple of days associated with some discomfort with urination. She tried over-the-counter Monistat but states that initially seemed to help but is no longer helping. She describes the discomfort as a burning sensation and itching near the vagina. She denies any actual abdominal pain or back pain. No nausea or vomiting. No chest pain or shortness of breath. No fever or chills. She is on her last day of her menstrual cycle. She would also like to be tested for STDs. Due to concern for vaginal discomfort, she came to the ED for further evaluation. REVIEW OF SYSTEMS    (2-9 systems for level 4, 10 or more for level 5)     Review of Systems   Constitutional: Negative for chills, diaphoresis, fatigue and fever. Respiratory: Negative for shortness of breath. Cardiovascular: Negative for chest pain. Gastrointestinal: Negative for abdominal pain, nausea and vomiting. Genitourinary: Positive for dysuria (mild discomfort), vaginal bleeding (on period) and vaginal pain (discomfort/itching). Negative for decreased urine volume, difficulty urinating, flank pain, frequency, hematuria, menstrual problem, pelvic pain and vaginal discharge. Musculoskeletal: Negative for back pain. Skin: Negative for color change, rash (denies any vaginal rashes) and wound. Neurological: Negative for weakness. Psychiatric/Behavioral: The patient is not nervous/anxious. Positives and Pertinent negatives as per HPI. PASTMEDICAL HISTORY     Past Medical History:   Diagnosis Date    Anxiety     states when she was younger         SURGICAL HISTORY     History reviewed. No pertinent surgical history. CURRENT MEDICATIONS       Discharge Medication List as of 6/27/2022  1:13 PM          ALLERGIES     Patient has no known allergies. FAMILY HISTORY     History reviewed. No pertinent family history. SOCIAL HISTORY       Social History     Socioeconomic History    Marital status: Single     Spouse name: None    Number of children: 0    Years of education: 15    Highest education level: None   Occupational History     Employer: [employer]   Tobacco Use    Smoking status: Never Smoker    Smokeless tobacco: Never Used   Substance and Sexual Activity    Alcohol use: Yes     Comment: occ    Drug use: Yes     Types: Marijuana Zollie Axe)     Comment: daily    Sexual activity: Yes     Partners: Male   Other Topics Concern    None   Social History Narrative    None     Social Determinants of Health     Financial Resource Strain:     Difficulty of Paying Living Expenses: Not on file   Food Insecurity:     Worried About Running Out of Food in the Last Year: Not on file    Maggie of Food in the Last Year: Not on file   Transportation Needs:     Lack of Transportation (Medical): Not on file    Lack of Transportation (Non-Medical):  Not on file   Physical Activity:     Days of Exercise per Week: Not on file    Minutes of Exercise per Session: Not on file   Stress:     Feeling of Stress : Not on file   Social Connections:     Frequency of Communication with Friends and Family: Not on file    Frequency of Social Gatherings with Friends and Family: Not on file    Attends Baptism Services: Not on file    Active Member of Clubs or Organizations: Not on file   [address] [place] [address] Attends Club or Organization Meetings: Not on file    Marital Status: Not on file   Intimate Partner Violence:     Fear of Current or Ex-Partner: Not on file    Emotionally Abused: Not on file    Physically Abused: Not on file    Sexually Abused: Not on file   Housing Stability:     Unable to Pay for Housing in the Last Year: Not on file    Number of [place] in the Last Year: Not on file    Unstable Housing in the Last Year: Not on file       SCREENINGS    [place] Coma Scale  Eye Opening: Spontaneous  Best Verbal Response: Oriented  Best Motor Response: Obeys commands  [place] Coma Scale Score: 15           PHYSICAL EXAM    (up to 7 for level 4, 8 or more for level 5)     ED Triage Vitals   BP Temp Temp src Pulse Resp SpO2 Height Weight   -- -- -- -- -- -- -- --       Physical Exam  Vitals and nursing note reviewed. Constitutional:       General: She is awake. She is not in acute distress. Appearance: Normal appearance. She is well-developed and well-groomed. She is morbidly obese. She is not ill-appearing, toxic-appearing or diaphoretic. Interventions: She is not intubated. HENT:      Head: Normocephalic and atraumatic. Mouth/Throat:      Mouth: Mucous membranes are moist.   Cardiovascular:      Rate and Rhythm: Normal rate and regular rhythm. Pulses: Normal pulses. Pulmonary:      Effort: Pulmonary effort is normal. No tachypnea, bradypnea, accessory muscle usage, prolonged expiration, respiratory distress or retractions. She is not intubated. Breath sounds: Normal breath sounds and air entry. No stridor. No wheezing or rhonchi. Abdominal:      General: Abdomen is flat. Bowel sounds are normal.      Palpations: Abdomen is soft. Tenderness: There is no abdominal tenderness. There is no right CVA tenderness, left CVA tenderness, guarding or rebound. Negative signs include Davis's sign and McBurney's sign.    Genitourinary:     Comments: Declined today  Musculoskeletal:      Cervical back: Normal range of motion and neck supple. Skin:     General: Skin is warm and dry. Coloration: Skin is not jaundiced. Findings: No erythema. Comments: No rash to exposed areas seen on arms and legs    Neurological:      General: No focal deficit present. Mental Status: She is alert and oriented to person, place, and time. Mental status is at baseline. Motor: No weakness. Psychiatric:         Mood and Affect: Mood normal.         Behavior: Behavior normal. Behavior is cooperative. DIAGNOSTIC RESULTS   :    Labs Reviewed   WET PREP, GENITAL - Abnormal; Notable for the following components:       Result Value    Clue Cells, Wet Prep 1+ (*)     All other components within normal limits   URINALYSIS - Abnormal; Notable for the following components:    Clarity, UA SL CLOUDY (*)     Blood, Urine MODERATE (*)     All other components within normal limits   MICROSCOPIC URINALYSIS - Abnormal; Notable for the following components:    Bacteria, UA 1+ (*)     All other components within normal limits   C.TRACHOMATIS N.GONORRHOEAE DNA   PREGNANCY, URINE       All other labs were within normal range or not returned asof this dictation. EKG:  All EKG's are interpreted by the Emergency Department Physician who either signs or Co-signs this chart in the absence of a cardiologist.        RADIOLOGY:   Non-plain film images such as CT, Ultrasound and MRI are read by the radiologist. Ros[place] Look images are visualized and preliminarily interpreted by the  ED Provider with the belowfindings:        Interpretation per the Radiologist below, if available at the time of this note:    No orders to display         PROCEDURES   Unless otherwise noted below, none     Procedures    CRITICAL CARE TIME   N/A    CONSULTS:  None    EMERGENCY DEPARTMENT COURSE and DIFFERENTIAL DIAGNOSIS/MDM:   Vitals:    Vitals:    06/27/22 1208   BP: 110/67   Pulse: 63   Resp: 16   Temp: 98.4 °F (36.9 °C)   TempSrc: Oral   SpO2: 97%   Weight: 254 lb 8 oz (115.4 kg)   Height: 5' 6.75\" (1.695 m)       Patient was given the following medications:  Medications   fluconazole (DIFLUCAN) tablet 150 mg (150 mg Oral Given 6/27/22 1837)     Patient was evaluated due to having some vaginal discomfort over the last couple of weeks that seem to have worsened over the past few days after initially trying Monistat over-the-counter. She denied any known concern for STDs but did want to be checked. She declined pelvic exam.  She did self swab. Urinalysis did not show any concern for infection. Even though wet prep was negative for yeast, I did give her 1 dose of Diflucan to see if this would help but also started her on Flagyl due to concern for bacterial vaginosis. She is aware that if her gonorrhea or chlamydia test positive, she will be contacted by nursing staff for further treatment. She was told not to drink alcohol on Flagyl. She is aware that if she develops any new abdominal pain with vomiting, fever, severe vaginal discomfort, or any other concerns, then return to the ED, but otherwise follow-up with her primary doctor over the next couple of days for any other concerns. She was well-appearing and in no acute distress at time of discharge and felt comfortable with this plan. She did have some blood in the urine but does report finishing her menstrual cycle today and therefore this is most likely related to that. Abdominal exam was benign and I have very low suspicion for any other serious pathology such as tubo-ovarian abscess, ovarian torsion, appendicitis, or other significant pathology at this time but again she was given strict return precautions if she does develop abdominal pain to return to the ED. pregnancy test was negative and therefore I have very low suspicion for ectopic pregnancy at this time. The patient tolerated their visit well.    The patient and / or the family were informed of the results of any tests, a time was given to answer questions. FINAL IMPRESSION      1. Bacterial vaginosis    2. Vaginal itching    3.  Vaginal bleeding          DISPOSITION/PLAN   DISPOSITION Decision To Discharge 06/27/2022 01:06:31 PM      PATIENT REFERRED TO:  [address] Emergency Department  [facility] [address] [place] [address]  [phone]  Go to   If symptoms worsen    [facility]  [address]  [facility]    In 3 days  For any other concerns      DISCHARGEMEDICATIONS:  Discharge Medication List as of 6/27/2022  1:13 PM      START taking these medications    Details   metroNIDAZOLE (FLAGYL) 500 MG tablet Take 1 tablet by mouth 2 times daily for 7 days, Disp-14 tablet, R-0Print             DISCONTINUED MEDICATIONS:  Discharge Medication List as of 6/27/2022  1:13 PM                 (Please note that portions of this note were completed with a voicerecognition program.  Efforts were made to edit the dictations but occasionally words are mis-transcribed.)    Luzma Elliott MD (electronically signed)            Luzma Elliott MD  06/28/22 [address], [place]  06/28/22 4104

## 2022-06-28 ASSESSMENT — ENCOUNTER SYMPTOMS
ABDOMINAL PAIN: 0
SHORTNESS OF BREATH: 0
BACK PAIN: 0
NAUSEA: 0
COLOR CHANGE: 0
VOMITING: 0

## 2022-06-29 ENCOUNTER — HOSPITAL ENCOUNTER (EMERGENCY)
Age: 22
Discharge: HOME | End: 2022-06-29

## 2022-06-29 VITALS
TEMPERATURE: 98.4 F | RESPIRATION RATE: 20 BRPM | OXYGEN SATURATION: 95 % | WEIGHT: 254.7 LBS | HEART RATE: 82 BPM | SYSTOLIC BLOOD PRESSURE: 129 MMHG | DIASTOLIC BLOOD PRESSURE: 69 MMHG | BODY MASS INDEX: 40.19 KG/M2

## 2022-06-29 DIAGNOSIS — N89.8 VAGINAL ITCHING: Primary | ICD-10-CM

## 2022-06-29 LAB
C TRACH DNA GENITAL QL NAA+PROBE: NEGATIVE
N. GONORRHOEAE DNA: NEGATIVE

## 2022-06-29 PROCEDURE — 99283 EMERGENCY DEPT VISIT LOW MDM: CPT

## 2022-06-29 PROCEDURE — 6370000000 HC RX 637 (ALT 250 FOR IP): Performed by: PHYSICIAN ASSISTANT

## 2022-06-29 RX ORDER — FLUCONAZOLE 150 MG/1
150 TABLET ORAL ONCE
Qty: 1 TABLET | Refills: 0 | Status: SHIPPED | OUTPATIENT
Start: 2022-06-29 | End: 2022-06-29

## 2022-06-29 RX ORDER — FLUCONAZOLE 100 MG/1
200 TABLET ORAL ONCE
Status: COMPLETED | OUTPATIENT
Start: 2022-06-29 | End: 2022-06-29

## 2022-06-29 RX ORDER — NYSTATIN 100000 U/G
OINTMENT TOPICAL
Qty: 1 EACH | Refills: 0 | Status: SHIPPED | OUTPATIENT
Start: 2022-06-29

## 2022-06-29 RX ADMIN — FLUCONAZOLE 200 MG: 100 TABLET ORAL at 19:42

## 2022-06-29 ASSESSMENT — ENCOUNTER SYMPTOMS
SHORTNESS OF BREATH: 0
RHINORRHEA: 0
DIARRHEA: 0
COUGH: 0
VOMITING: 0
NAUSEA: 0
ABDOMINAL PAIN: 0

## 2022-06-29 ASSESSMENT — PAIN - FUNCTIONAL ASSESSMENT: PAIN_FUNCTIONAL_ASSESSMENT: NONE - DENIES PAIN

## 2022-06-29 NOTE — LETTER
[facility] Emergency Department  [address], [facility], [address]             June 29, 2022    Patient: Courtney Arnett   YOB: 2000   Date of Visit: 6/29/2022       To Whom It May Concern:    Courtney Arnett was seen and treated in our emergency department on 6/29/2022. She may return to work on Friday 7/1/2022.       Sincerely,         [facility] Emergency Dept

## 2022-06-30 NOTE — ED PROVIDER NOTES
[facility]        Pt Name: Audrey Bateman  MRN: [id number]  [place] 2000  Date of evaluation: 6/29/2022  Provider: Jane Garcia PA-C  PCP: No primary care provider on file. Note Started: 9:09 PM EDT       CONNIE. I have evaluated this patient. My supervising physician was available for consultation. CHIEF COMPLAINT       Chief Complaint   Patient presents with    Vaginal Pain     Pt c/o itching and dryness to vagina since taking ATB for BV       HISTORY OF PRESENT ILLNESS   (Location, Timing/Onset, Context/Setting, Quality, Duration, Modifying Factors, Severity, Associated Signs and Symptoms)  Note limiting factors. Chief Complaint: Vaginal itching      Audrey Btaeman is a 22 y.o. female who presents to the emergency department today for evaluation for vaginal itching. The patient was actually seen at a outside facility 2 days ago, was diagnosed with bacterial vaginosis, and was given a one-time dose of Diflucan for yeast infection. Patient states that she continues to have vaginal itching although she denies any vaginal pain, discharge or bleeding. Patient states that she is here because she is unable to go to work secondary to the vaginal itching. Patient states that she is not concerned for STDs and she states that they did test her for these. She denies any fever chills. No cough or congestion. She has no abdominal pain. No nausea, vomiting or diarrhea. No dysuria hematuria, no other complaints    Nursing Notes were all reviewed and agreed with or any disagreements were addressed in the HPI. REVIEW OF SYSTEMS    (2-9 systems for level 4, 10 or more for level 5)     Review of Systems   Constitutional: Negative for activity change, appetite change, chills and fever. HENT: Negative for congestion and rhinorrhea. Respiratory: Negative for cough and shortness of breath. Cardiovascular: Negative for chest pain. Gastrointestinal: Negative for abdominal pain, diarrhea, nausea and vomiting. Genitourinary: Negative for difficulty urinating, dysuria, hematuria, vaginal discharge and vaginal pain. Positives and Pertinent negatives as per HPI. Except as noted above in the ROS, all other systems were reviewed and negative. PAST MEDICAL HISTORY     Past Medical History:   Diagnosis Date    Anxiety     states when she was younger         SURGICAL HISTORY   History reviewed. No pertinent surgical history. [address]       Discharge Medication List as of 6/29/2022  7:40 PM      CONTINUE these medications which have NOT CHANGED    Details   metroNIDAZOLE (FLAGYL) 500 MG tablet Take 1 tablet by mouth 2 times daily for 7 days, Disp-14 tablet, R-0Print               ALLERGIES     Patient has no known allergies. FAMILYHISTORY     History reviewed. No pertinent family history. SOCIAL HISTORY       Social History     Tobacco Use    Smoking status: Never Smoker    Smokeless tobacco: Never Used   Substance Use Topics    Alcohol use: Yes     Comment: occ    Drug use: Yes     Types: Marijuana (Weed)     Comment: daily       SCREENINGS    [place] Coma Scale  Eye Opening: Spontaneous  Best Verbal Response: Oriented  Best Motor Response: Obeys commands  Dea Coma Scale Score: 15        PHYSICAL EXAM    (up to 7 for level 4, 8 or more for level 5)     ED Triage Vitals [06/29/22 1858]   BP Temp Temp Source Heart Rate Resp SpO2 Height Weight   129/69 98.4 °F (36.9 °C) Oral (!) 130 20 95 % -- 254 lb 11.2 oz (115.5 kg)       Physical Exam  Vitals and nursing note reviewed. Constitutional:       Appearance: She is well-developed. She is not diaphoretic. HENT:      Head: Normocephalic and atraumatic. Right Ear: External ear normal.      Left Ear: External ear normal.      Nose: Nose normal.   Eyes:      General:         Right eye: No discharge. Left eye: No discharge.    Neck:      Trachea: No tracheal deviation. Cardiovascular:      Rate and Rhythm: Normal rate and regular rhythm. Heart sounds: No murmur heard. Pulmonary:      Effort: Pulmonary effort is normal. No respiratory distress. Breath sounds: Normal breath sounds. No wheezing or rales. Abdominal:      General: Bowel sounds are normal. There is no distension. Tenderness: There is no abdominal tenderness. There is no guarding. Genitourinary:     Comments: External  exam performed only, there is beefy erythema noted to bilateral labia majora with excoriations noted no other lesions noted  Musculoskeletal:         General: Normal range of motion. Cervical back: Normal range of motion and neck supple. Skin:     General: Skin is warm and dry. Neurological:      Mental Status: She is alert and oriented to person, place, and time. Psychiatric:         Behavior: Behavior normal.         DIAGNOSTIC RESULTS   LABS:    Labs Reviewed - No data to display    When ordered only abnormal lab results are displayed. All other labs were within normal range or not returned as of this dictation. EKG: When ordered, EKG's are interpreted by the Emergency Department Physician in the absence of a cardiologist.  Please see their note for interpretation of EKG. RADIOLOGY:   Non-plain film images such as CT, Ultrasound and MRI are read by the radiologist. Plain radiographic images are visualized and preliminarily interpreted by the ED Provider with the below findings:        Interpretation per the Radiologist below, if available at the time of this note:    No orders to display     No results found.         PROCEDURES   Unless otherwise noted below, none     Procedures    CRITICAL CARE TIME       CONSULTS:  None      EMERGENCY DEPARTMENT COURSE and DIFFERENTIAL DIAGNOSIS/MDM:   Vitals:    Vitals:    06/29/22 1858 06/29/22 1931   BP: 129/69    Pulse: (!) 130 82   Resp: 20    Temp: 98.4 °F (36.9 °C)    TempSrc: Oral    SpO2: 95% Weight: 254 lb 11.2 oz (115.5 kg)        Patient was given the following medications:  Medications   fluconazole (DIFLUCAN) tablet 200 mg (200 mg Oral Given 6/29/22 1942)         Is this patient to be included in the SEP-1 Core Measure due to severe sepsis or septic shock? No   Exclusion criteria - the patient is NOT to be included for SEP-1 Core Measure due to: Infection is not suspected    Briefly, this is a 73-year-old female who presents emergency department today for evaluation for vaginal itching. The patient states that she was seen 2 days ago for similar symptoms. She was diagnosed with bacterial vaginosis, and was given Flagyl. Patient states that she continues to have vaginal itching    On examination, she does have beefy erythema noted to bilateral labia majora, consistent with a candidal yeast infection. We will give a dose of Diflucan here and she will be given nystatin and Diflucan for home. She was given a work note per her request.  I recommended follow-up with her primary care physician in 2 to 3 days. She is to return to the ED for any new or worsening symptoms, patient voiced understanding is agreeable with plan. Stable for discharge. Suspicion is low at this time for ectopic pregnancy, acute surgical abdomen, PID or other emergent etiology    FINAL IMPRESSION      1. Vaginal itching          DISPOSITION/PLAN   DISPOSITION Decision To Discharge 06/29/2022 07:31:43 PM      PATIENT REFERRED TO:  Elaina Haas  [phone]  Schedule an appointment as soon as possible for a visit in 2 days      [facility] Emergency Department  [address] [facility]  [phone]    If symptoms worsen, As needed      DISCHARGE MEDICATIONS:  Discharge Medication List as of 6/29/2022  7:40 PM      START taking these medications    Details   nystatin (MYCOSTATIN) 549201 UNIT/GM ointment Apply topically 2 times daily. , Disp-1 each, R-0, Print      fluconazole (DIFLUCAN) 150 MG tablet Take 1 tablet by mouth once for 1 dose, Disp-1 tablet, R-0Print             DISCONTINUED MEDICATIONS:  Discharge Medication List as of 6/29/2022  7:40 PM                 (Please note that portions of this note were completed with a voice recognition program.  Efforts were made to edit the dictations but occasionally words are mis-transcribed.)    Marcelino Ramsay PA-C (electronically signed)            Marcelino Ramsay PA-C  06/29/22 7249

## 2022-10-11 ENCOUNTER — HOSPITAL ENCOUNTER (EMERGENCY)
Age: 22
Discharge: HOME | End: 2022-10-11
Attending: EMERGENCY MEDICINE

## 2022-10-11 VITALS
HEIGHT: 66 IN | DIASTOLIC BLOOD PRESSURE: 88 MMHG | BODY MASS INDEX: 39.86 KG/M2 | SYSTOLIC BLOOD PRESSURE: 132 MMHG | WEIGHT: 248 LBS | TEMPERATURE: 98.5 F | RESPIRATION RATE: 14 BRPM | OXYGEN SATURATION: 100 % | HEART RATE: 100 BPM

## 2022-10-11 DIAGNOSIS — M62.838 SPASM OF MUSCLE: Primary | ICD-10-CM

## 2022-10-11 DIAGNOSIS — S29.019A THORACIC MYOFASCIAL STRAIN, INITIAL ENCOUNTER: ICD-10-CM

## 2022-10-11 PROCEDURE — 99284 EMERGENCY DEPT VISIT MOD MDM: CPT

## 2022-10-11 PROCEDURE — 96372 THER/PROPH/DIAG INJ SC/IM: CPT

## 2022-10-11 PROCEDURE — 6360000002 HC RX W HCPCS: Performed by: EMERGENCY MEDICINE

## 2022-10-11 RX ORDER — KETOROLAC TROMETHAMINE 30 MG/ML
30 INJECTION, SOLUTION INTRAMUSCULAR; INTRAVENOUS ONCE
Status: COMPLETED | OUTPATIENT
Start: 2022-10-11 | End: 2022-10-11

## 2022-10-11 RX ORDER — METHOCARBAMOL 750 MG/1
750 TABLET, FILM COATED ORAL 4 TIMES DAILY
Qty: 40 TABLET | Refills: 0 | Status: SHIPPED | OUTPATIENT
Start: 2022-10-11 | End: 2022-10-21

## 2022-10-11 RX ADMIN — KETOROLAC TROMETHAMINE 30 MG: 30 INJECTION, SOLUTION INTRAMUSCULAR at 14:50

## 2022-10-11 ASSESSMENT — PAIN DESCRIPTION - PAIN TYPE: TYPE: ACUTE PAIN

## 2022-10-11 ASSESSMENT — PAIN - FUNCTIONAL ASSESSMENT
PAIN_FUNCTIONAL_ASSESSMENT: 0-10
PAIN_FUNCTIONAL_ASSESSMENT: NONE - DENIES PAIN

## 2022-10-11 ASSESSMENT — PAIN DESCRIPTION - DESCRIPTORS: DESCRIPTORS: ACHING

## 2022-10-11 ASSESSMENT — PAIN DESCRIPTION - LOCATION: LOCATION: BACK

## 2022-10-11 ASSESSMENT — PAIN SCALES - GENERAL: PAINLEVEL_OUTOF10: 8

## 2022-10-11 ASSESSMENT — PAIN DESCRIPTION - FREQUENCY: FREQUENCY: CONTINUOUS

## 2022-10-11 NOTE — DISCHARGE INSTRUCTIONS
Take the muscle relaxer as prescribed. Please take tylenol and ibuprofen on a scheduled basis. Return for worsening symptoms.
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
## 2022-10-11 NOTE — Clinical Note
Karen Travis was seen and treated in our emergency department on 10/11/2022. She may return to work on 10/13/2022. If you have any questions or concerns, please don't hesitate to call.       Emnai Fernandez MD

## 2022-10-11 NOTE — ED NOTES
Patient given prescription, work note, discharge instructions verbal and written, patient verbalized understanding. Alert/oriented X4, Clear speech.   Patient exhibits no distress, ambulates with steady gait per self leaving unit, no further request.      Sendy Roper RN  10/11/22 2320

## 2023-04-16 ENCOUNTER — HOSPITAL ENCOUNTER (EMERGENCY)
Age: 23
Discharge: HOME | End: 2023-04-16
Attending: EMERGENCY MEDICINE

## 2023-04-16 VITALS
OXYGEN SATURATION: 100 % | HEART RATE: 83 BPM | RESPIRATION RATE: 16 BRPM | HEIGHT: 67 IN | TEMPERATURE: 99 F | SYSTOLIC BLOOD PRESSURE: 123 MMHG | DIASTOLIC BLOOD PRESSURE: 78 MMHG | WEIGHT: 230.4 LBS | BODY MASS INDEX: 36.16 KG/M2

## 2023-04-16 DIAGNOSIS — J02.9 ACUTE PHARYNGITIS, UNSPECIFIED ETIOLOGY: Primary | ICD-10-CM

## 2023-04-16 LAB — S PYO AG THROAT QL: NEGATIVE

## 2023-04-16 PROCEDURE — 99283 EMERGENCY DEPT VISIT LOW MDM: CPT

## 2023-04-16 PROCEDURE — 87077 CULTURE AEROBIC IDENTIFY: CPT

## 2023-04-16 PROCEDURE — 87880 STREP A ASSAY W/OPTIC: CPT

## 2023-04-16 PROCEDURE — 87081 CULTURE SCREEN ONLY: CPT

## 2023-04-16 RX ORDER — ACETAMINOPHEN 500 MG
500 TABLET ORAL EVERY 6 HOURS PRN
Qty: 30 TABLET | Refills: 0 | Status: SHIPPED | OUTPATIENT
Start: 2023-04-16

## 2023-04-16 RX ORDER — PREDNISONE 20 MG/1
20 TABLET ORAL DAILY
Qty: 3 TABLET | Refills: 0 | Status: SHIPPED | OUTPATIENT
Start: 2023-04-16 | End: 2023-04-19

## 2023-04-16 ASSESSMENT — PAIN SCALES - GENERAL: PAINLEVEL_OUTOF10: 8

## 2023-04-16 ASSESSMENT — PAIN DESCRIPTION - LOCATION: LOCATION: THROAT

## 2023-04-16 ASSESSMENT — PAIN DESCRIPTION - PAIN TYPE: TYPE: ACUTE PAIN

## 2023-04-16 ASSESSMENT — PAIN - FUNCTIONAL ASSESSMENT
PAIN_FUNCTIONAL_ASSESSMENT: 0-10
PAIN_FUNCTIONAL_ASSESSMENT: ACTIVITIES ARE NOT PREVENTED

## 2023-04-16 ASSESSMENT — PAIN DESCRIPTION - DESCRIPTORS: DESCRIPTORS: SORE

## 2023-04-18 LAB
ORGANISM: ABNORMAL
S PYO THROAT QL CULT: ABNORMAL
S PYO THROAT QL CULT: ABNORMAL

## 2023-08-21 ENCOUNTER — HOSPITAL ENCOUNTER (EMERGENCY)
Age: 23
Discharge: HOME | End: 2023-08-21
Attending: EMERGENCY MEDICINE

## 2023-08-21 VITALS
BODY MASS INDEX: 35.84 KG/M2 | WEIGHT: 223 LBS | RESPIRATION RATE: 18 BRPM | SYSTOLIC BLOOD PRESSURE: 123 MMHG | OXYGEN SATURATION: 100 % | TEMPERATURE: 98.6 F | DIASTOLIC BLOOD PRESSURE: 83 MMHG | HEART RATE: 74 BPM | HEIGHT: 66 IN

## 2023-08-21 DIAGNOSIS — Z20.2 POSSIBLE EXPOSURE TO STD: ICD-10-CM

## 2023-08-21 DIAGNOSIS — M54.50 ACUTE BILATERAL LOW BACK PAIN, UNSPECIFIED WHETHER SCIATICA PRESENT: ICD-10-CM

## 2023-08-21 DIAGNOSIS — R11.2 NAUSEA AND VOMITING, UNSPECIFIED VOMITING TYPE: ICD-10-CM

## 2023-08-21 DIAGNOSIS — N39.0 URINARY TRACT INFECTION WITHOUT HEMATURIA, SITE UNSPECIFIED: Primary | ICD-10-CM

## 2023-08-21 LAB
BACTERIA GENITAL QL WET PREP: ABNORMAL
BACTERIA URNS QL MICRO: ABNORMAL /HPF
BILIRUB UR QL STRIP.AUTO: NEGATIVE
CLARITY UR: ABNORMAL
CLUE CELLS SPEC QL WET PREP: ABNORMAL
COLOR UR: YELLOW
EPI CELLS #/AREA URNS HPF: ABNORMAL /HPF (ref 0–5)
EPI CELLS SPEC QL WET PREP: ABNORMAL
GLUCOSE UR STRIP.AUTO-MCNC: NEGATIVE MG/DL
HCG UR QL: NEGATIVE
HGB UR QL STRIP.AUTO: ABNORMAL
KETONES UR STRIP.AUTO-MCNC: NEGATIVE MG/DL
LEUKOCYTE ESTERASE UR QL STRIP.AUTO: ABNORMAL
NITRITE UR QL STRIP.AUTO: NEGATIVE
PH UR STRIP.AUTO: 7 [PH] (ref 5–8)
PROT UR STRIP.AUTO-MCNC: ABNORMAL MG/DL
RBC #/AREA URNS HPF: ABNORMAL /HPF (ref 0–4)
RBC SPEC QL WET PREP: ABNORMAL
SP GR UR STRIP.AUTO: 1.01 (ref 1–1.03)
SPECIMEN SOURCE FLD: ABNORMAL
T VAGINALIS GENITAL QL WET PREP: ABNORMAL
UA COMPLETE W REFLEX CULTURE PNL UR: YES
UA DIPSTICK W REFLEX MICRO PNL UR: YES
URN SPEC COLLECT METH UR: ABNORMAL
UROBILINOGEN UR STRIP-ACNC: 2 E.U./DL
WBC #/AREA URNS HPF: >100 /HPF (ref 0–5)
WBC SPEC QL WET PREP: ABNORMAL
YEAST GENITAL QL WET PREP: ABNORMAL

## 2023-08-21 PROCEDURE — 99284 EMERGENCY DEPT VISIT MOD MDM: CPT

## 2023-08-21 PROCEDURE — 6360000002 HC RX W HCPCS: Performed by: EMERGENCY MEDICINE

## 2023-08-21 PROCEDURE — 84703 CHORIONIC GONADOTROPIN ASSAY: CPT

## 2023-08-21 PROCEDURE — 87210 SMEAR WET MOUNT SALINE/INK: CPT

## 2023-08-21 PROCEDURE — 87491 CHLMYD TRACH DNA AMP PROBE: CPT

## 2023-08-21 PROCEDURE — 96372 THER/PROPH/DIAG INJ SC/IM: CPT

## 2023-08-21 PROCEDURE — 81001 URINALYSIS AUTO W/SCOPE: CPT

## 2023-08-21 PROCEDURE — 87591 N.GONORRHOEAE DNA AMP PROB: CPT

## 2023-08-21 PROCEDURE — 6370000000 HC RX 637 (ALT 250 FOR IP): Performed by: EMERGENCY MEDICINE

## 2023-08-21 PROCEDURE — 87086 URINE CULTURE/COLONY COUNT: CPT

## 2023-08-21 RX ORDER — DOXYCYCLINE HYCLATE 100 MG
100 TABLET ORAL 2 TIMES DAILY
Qty: 14 TABLET | Refills: 0 | Status: SHIPPED | OUTPATIENT
Start: 2023-08-21 | End: 2023-08-28

## 2023-08-21 RX ORDER — DOXYCYCLINE 100 MG/1
100 CAPSULE ORAL ONCE
Status: COMPLETED | OUTPATIENT
Start: 2023-08-21 | End: 2023-08-21

## 2023-08-21 RX ORDER — KETOROLAC TROMETHAMINE 30 MG/ML
30 INJECTION, SOLUTION INTRAMUSCULAR; INTRAVENOUS ONCE
Status: COMPLETED | OUTPATIENT
Start: 2023-08-21 | End: 2023-08-21

## 2023-08-21 RX ORDER — KETOROLAC TROMETHAMINE 30 MG/ML
30 INJECTION, SOLUTION INTRAMUSCULAR; INTRAVENOUS ONCE
Status: DISCONTINUED | OUTPATIENT
Start: 2023-08-21 | End: 2023-08-21

## 2023-08-21 RX ORDER — CEPHALEXIN 500 MG/1
500 CAPSULE ORAL 4 TIMES DAILY
Qty: 28 CAPSULE | Refills: 0 | Status: SHIPPED | OUTPATIENT
Start: 2023-08-21 | End: 2023-08-28

## 2023-08-21 RX ORDER — ONDANSETRON 4 MG/1
4 TABLET, FILM COATED ORAL 3 TIMES DAILY PRN
Qty: 15 TABLET | Refills: 0 | Status: SHIPPED | OUTPATIENT
Start: 2023-08-21

## 2023-08-21 RX ORDER — PHENAZOPYRIDINE HYDROCHLORIDE 100 MG/1
200 TABLET, FILM COATED ORAL 3 TIMES DAILY PRN
Qty: 12 TABLET | Refills: 0 | Status: SHIPPED | OUTPATIENT
Start: 2023-08-21 | End: 2023-08-24

## 2023-08-21 RX ORDER — ONDANSETRON 4 MG/1
4 TABLET, ORALLY DISINTEGRATING ORAL ONCE
Status: COMPLETED | OUTPATIENT
Start: 2023-08-21 | End: 2023-08-21

## 2023-08-21 RX ADMIN — KETOROLAC TROMETHAMINE 30 MG: 30 INJECTION, SOLUTION INTRAMUSCULAR; INTRAVENOUS at 12:56

## 2023-08-21 RX ADMIN — DOXYCYCLINE 100 MG: 100 CAPSULE ORAL at 13:35

## 2023-08-21 RX ADMIN — ONDANSETRON 4 MG: 4 TABLET, ORALLY DISINTEGRATING ORAL at 12:56

## 2023-08-21 ASSESSMENT — PAIN - FUNCTIONAL ASSESSMENT: PAIN_FUNCTIONAL_ASSESSMENT: 0-10

## 2023-08-21 ASSESSMENT — PAIN SCALES - GENERAL
PAINLEVEL_OUTOF10: 9
PAINLEVEL_OUTOF10: 8

## 2023-08-21 NOTE — ED PROVIDER NOTES
13:07  ANTIBIOTICS AT [facility].:                      RECEIVED :  08/21/23 23:35   PREGNANCY, URINE       When ordered, only abnormal lab results are displayed. All other labs were within normal range or not returned as of this dictation. PROCEDURES:  Unless otherwise noted below, none. Procedures      MEDICATIONS:   Medications   ondansetron (ZOFRAN-ODT) disintegrating tablet 4 mg (4 mg Oral Given 8/21/23 1256)   ketorolac (TORADOL) injection 30 mg (30 mg IntraMUSCular Given 8/21/23 1256)   doxycycline monohydrate (MONODOX) capsule 100 mg (100 mg Oral Given 8/21/23 1335)       _____________________________________    MEDICAL DECISION MAKING:     Patient is a 26 y.o. female with a significant PMHx as above, presenting emergency department today with concerns of UTI with some suprapubic abdominal pain and back pain. No flank pain on physical exam, pain is mostly in her low back. No focal nausea deficits, and well-appearing. Also has been having some headaches over the past month, treated with Excedrin at home, no headache here in the ED. Laboratory evaluation today consistent with urinalysis showing UTI. Gonorrhea and chlamydia pending. Negative pregnancy test.  Will treat with doxycycline, does not want ceftriaxone today, just wanted testing and will watch her MyChart for gonorrhea and chlamydia. Discharged home with antibiotics for Keflex, and did prescribe her doxycycline, however she knows she will return to emergency department if she sees that her gonorrhea is positive. Strict return precautions provided at length. Follow-up with PCP. Is this patient to be included in the SEP-1 Core Measure due to severe sepsis or septic shock? No   Exclusion criteria - the patient is NOT to be included for SEP-1 Core Measure due to:  2+ SIRS criteria are not met      CLINICAL IMPRESSION:     ICD-10-CM    1. Urinary tract infection without hematuria, site unspecified  N39.0       2.  Nausea and

## 2023-08-22 LAB
BACTERIA UR CULT: NORMAL
C TRACH DNA CVX QL NAA+PROBE: NEGATIVE
N GONORRHOEA DNA CERV MUCUS QL NAA+PROBE: NEGATIVE

## 2023-08-24 ASSESSMENT — ENCOUNTER SYMPTOMS
CHEST TIGHTNESS: 0
VOMITING: 0
RHINORRHEA: 0
DIARRHEA: 0
SHORTNESS OF BREATH: 0
ABDOMINAL PAIN: 0
BACK PAIN: 1
COUGH: 0

## 2023-12-14 ENCOUNTER — HOSPITAL ENCOUNTER (EMERGENCY)
Age: 23
Discharge: HOME | End: 2023-12-14
Attending: EMERGENCY MEDICINE

## 2023-12-14 VITALS
WEIGHT: 223.3 LBS | SYSTOLIC BLOOD PRESSURE: 114 MMHG | RESPIRATION RATE: 12 BRPM | HEIGHT: 67 IN | TEMPERATURE: 99.3 F | OXYGEN SATURATION: 100 % | HEART RATE: 64 BPM | DIASTOLIC BLOOD PRESSURE: 64 MMHG | BODY MASS INDEX: 35.05 KG/M2

## 2023-12-14 DIAGNOSIS — O21.9 NAUSEA AND VOMITING IN PREGNANCY: Primary | ICD-10-CM

## 2023-12-14 DIAGNOSIS — A59.9 TRICHOMONIASIS: ICD-10-CM

## 2023-12-14 LAB
ALBUMIN SERPL-MCNC: 5 G/DL (ref 3.4–5)
ALBUMIN/GLOB SERPL: 1.4 {RATIO} (ref 1.1–2.2)
ALP SERPL-CCNC: 53 U/L (ref 40–129)
ALT SERPL-CCNC: 8 U/L (ref 10–40)
ANION GAP SERPL CALCULATED.3IONS-SCNC: 12 MMOL/L (ref 3–16)
AST SERPL-CCNC: 15 U/L (ref 15–37)
B-HCG SERPL EIA 3RD IS-ACNC: NORMAL MIU/ML
BACTERIA URNS QL MICRO: ABNORMAL /HPF
BASOPHILS # BLD: 0 K/UL (ref 0–0.2)
BASOPHILS NFR BLD: 0.3 %
BILIRUB SERPL-MCNC: 0.6 MG/DL (ref 0–1)
BILIRUB UR QL STRIP.AUTO: NEGATIVE
BUN SERPL-MCNC: 7 MG/DL (ref 7–20)
CALCIUM SERPL-MCNC: 10 MG/DL (ref 8.3–10.6)
CHLORIDE SERPL-SCNC: 97 MMOL/L (ref 99–110)
CLARITY UR: CLEAR
CO2 SERPL-SCNC: 26 MMOL/L (ref 21–32)
COLOR UR: YELLOW
CREAT SERPL-MCNC: 0.7 MG/DL (ref 0.6–1.1)
DEPRECATED RDW RBC AUTO: 14.2 % (ref 12.4–15.4)
EOSINOPHIL # BLD: 0.1 K/UL (ref 0–0.6)
EOSINOPHIL NFR BLD: 1.2 %
EPI CELLS #/AREA URNS HPF: ABNORMAL /HPF (ref 0–5)
GFR SERPLBLD CREATININE-BSD FMLA CKD-EPI: >60 ML/MIN/{1.73_M2}
GLUCOSE SERPL-MCNC: 82 MG/DL (ref 70–99)
GLUCOSE UR STRIP.AUTO-MCNC: NEGATIVE MG/DL
HCT VFR BLD AUTO: 41.7 % (ref 36–48)
HGB BLD-MCNC: 13.6 G/DL (ref 12–16)
HGB UR QL STRIP.AUTO: ABNORMAL
KETONES UR STRIP.AUTO-MCNC: 40 MG/DL
LEUKOCYTE ESTERASE UR QL STRIP.AUTO: ABNORMAL
LIPASE SERPL-CCNC: 24 U/L (ref 13–60)
LYMPHOCYTES # BLD: 1.6 K/UL (ref 1–5.1)
LYMPHOCYTES NFR BLD: 20.9 %
MAGNESIUM SERPL-MCNC: 2.2 MG/DL (ref 1.8–2.4)
MCH RBC QN AUTO: 28.3 PG (ref 26–34)
MCHC RBC AUTO-ENTMCNC: 32.7 G/DL (ref 31–36)
MCV RBC AUTO: 86.3 FL (ref 80–100)
MONOCYTES # BLD: 0.5 K/UL (ref 0–1.3)
MONOCYTES NFR BLD: 6.6 %
MUCOUS THREADS #/AREA URNS LPF: ABNORMAL /LPF
NEUTROPHILS # BLD: 5.5 K/UL (ref 1.7–7.7)
NEUTROPHILS NFR BLD: 71 %
NITRITE UR QL STRIP.AUTO: NEGATIVE
PH UR STRIP.AUTO: 6.5 [PH] (ref 5–8)
PLATELET # BLD AUTO: 240 K/UL (ref 135–450)
PMV BLD AUTO: 8.3 FL (ref 5–10.5)
POTASSIUM SERPL-SCNC: 3.5 MMOL/L (ref 3.5–5.1)
PROT SERPL-MCNC: 8.7 G/DL (ref 6.4–8.2)
PROT UR STRIP.AUTO-MCNC: NEGATIVE MG/DL
RBC # BLD AUTO: 4.83 M/UL (ref 4–5.2)
RBC #/AREA URNS HPF: ABNORMAL /HPF (ref 0–4)
SODIUM SERPL-SCNC: 135 MMOL/L (ref 136–145)
SP GR UR STRIP.AUTO: 1.01 (ref 1–1.03)
TRICHOMONAS #/AREA URNS HPF: PRESENT /HPF
UA DIPSTICK W REFLEX MICRO PNL UR: YES
URN SPEC COLLECT METH UR: ABNORMAL
UROBILINOGEN UR STRIP-ACNC: 1 E.U./DL
WBC # BLD AUTO: 7.7 K/UL (ref 4–11)
WBC #/AREA URNS HPF: ABNORMAL /HPF (ref 0–5)

## 2023-12-14 PROCEDURE — 6360000002 HC RX W HCPCS: Performed by: EMERGENCY MEDICINE

## 2023-12-14 PROCEDURE — 84702 CHORIONIC GONADOTROPIN TEST: CPT

## 2023-12-14 PROCEDURE — 85025 COMPLETE CBC W/AUTO DIFF WBC: CPT

## 2023-12-14 PROCEDURE — 83690 ASSAY OF LIPASE: CPT

## 2023-12-14 PROCEDURE — 83735 ASSAY OF MAGNESIUM: CPT

## 2023-12-14 PROCEDURE — 2580000003 HC RX 258: Performed by: EMERGENCY MEDICINE

## 2023-12-14 PROCEDURE — 99284 EMERGENCY DEPT VISIT MOD MDM: CPT

## 2023-12-14 PROCEDURE — 80053 COMPREHEN METABOLIC PANEL: CPT

## 2023-12-14 PROCEDURE — 96374 THER/PROPH/DIAG INJ IV PUSH: CPT

## 2023-12-14 PROCEDURE — 81001 URINALYSIS AUTO W/SCOPE: CPT

## 2023-12-14 RX ORDER — ONDANSETRON 2 MG/ML
4 INJECTION INTRAMUSCULAR; INTRAVENOUS ONCE
Status: COMPLETED | OUTPATIENT
Start: 2023-12-14 | End: 2023-12-14

## 2023-12-14 RX ORDER — METRONIDAZOLE 500 MG/1
500 TABLET ORAL 2 TIMES DAILY
Qty: 14 TABLET | Refills: 0 | Status: SHIPPED | OUTPATIENT
Start: 2023-12-14 | End: 2023-12-21

## 2023-12-14 RX ORDER — ONDANSETRON 4 MG/1
4 TABLET, FILM COATED ORAL 3 TIMES DAILY PRN
Qty: 15 TABLET | Refills: 0 | Status: SHIPPED | OUTPATIENT
Start: 2023-12-14

## 2023-12-14 RX ORDER — 0.9 % SODIUM CHLORIDE 0.9 %
1000 INTRAVENOUS SOLUTION INTRAVENOUS ONCE
Status: COMPLETED | OUTPATIENT
Start: 2023-12-14 | End: 2023-12-14

## 2023-12-14 RX ADMIN — SODIUM CHLORIDE 1000 ML: 9 INJECTION, SOLUTION INTRAVENOUS at 16:26

## 2023-12-14 RX ADMIN — ONDANSETRON 4 MG: 2 INJECTION INTRAMUSCULAR; INTRAVENOUS at 16:25

## 2023-12-14 ASSESSMENT — PAIN DESCRIPTION - DESCRIPTORS: DESCRIPTORS: CRAMPING

## 2023-12-14 ASSESSMENT — PAIN DESCRIPTION - ORIENTATION: ORIENTATION: RIGHT;LEFT;LOWER;MID;UPPER

## 2023-12-14 ASSESSMENT — PAIN - FUNCTIONAL ASSESSMENT: PAIN_FUNCTIONAL_ASSESSMENT: 0-10

## 2023-12-14 ASSESSMENT — PAIN DESCRIPTION - LOCATION: LOCATION: ABDOMEN

## 2023-12-14 ASSESSMENT — PAIN DESCRIPTION - PAIN TYPE: TYPE: ACUTE PAIN

## 2023-12-14 ASSESSMENT — PAIN DESCRIPTION - FREQUENCY: FREQUENCY: CONTINUOUS

## 2023-12-14 ASSESSMENT — PAIN SCALES - GENERAL: PAINLEVEL_OUTOF10: 7

## 2023-12-14 NOTE — ED PROVIDER NOTES
[address]            Pt Name: Wilfred Awad   MRN: [id number]   [address] [facility] 2000   Date of evaluation: 12/14/2023   Provider: Avery Anthony DO   PCP: SAUMYA Valle CNP   Note Started: 5:37 PM EST 12/14/23          CHIEF COMPLAINT     Chief Complaint   Patient presents with    Emesis During Pregnancy     Pt reports nausea and vomiting. Pt reports \"I have been throwing up straight since Monday. \" Pt reports this is her first pregnancy             HISTORY OF PRESENT ILLNESS:   History from : Patient   Limitations to history : None     Wilfred Awad is a 26 y.o. female who presents to the emergency department complaining of nausea and vomiting in early pregnancy. Patient states that she is a G1, P0. She has an estimated 6 weeks pregnant at this time and reportedly had an IUP noted at the pregnancy center early in the week. Patient reports nausea and vomiting over the last several days. She denies fevers, chills, or sweats. Mild abdominal discomfort noted. No vaginal bleeding or discharge. Nursing Notes were all reviewed and agreed with, or any disagreements were addressed in the HPI. REVIEW OF SYSTEMS :    Positives and Pertinent negatives as per HPI. MEDICAL HISTORY   has a past medical history of Anxiety. History reviewed. No pertinent surgical history. CURRENTMEDICATIONS       Previous Medications    ACETAMINOPHEN (TYLENOL) 500 MG TABLET    Take 1 tablet by mouth every 6 hours as needed for Pain    NYSTATIN (MYCOSTATIN) 188218 UNIT/GM OINTMENT    Apply topically 2 times daily.     ONDANSETRON (ZOFRAN) 4 MG TABLET    Take 1 tablet by mouth 3 times daily as needed for Nausea or Vomiting      SCREENINGS          Dea Coma Scale  Eye Opening: Spontaneous  Best Verbal Response: Oriented  Best Motor Response: Obeys commands  [place] Coma Scale Score: 15                CIWA Assessment  BP: 114/64  Pulse: 64

## 2024-03-06 ENCOUNTER — HOSPITAL ENCOUNTER (EMERGENCY)
Age: 24
Discharge: HOME | End: 2024-03-06
Attending: EMERGENCY MEDICINE
Payer: COMMERCIAL

## 2024-03-06 VITALS
BODY MASS INDEX: 37.46 KG/M2 | OXYGEN SATURATION: 100 % | TEMPERATURE: 98.2 F | RESPIRATION RATE: 16 BRPM | HEART RATE: 84 BPM | HEIGHT: 67 IN | DIASTOLIC BLOOD PRESSURE: 76 MMHG | SYSTOLIC BLOOD PRESSURE: 112 MMHG | WEIGHT: 238.7 LBS

## 2024-03-06 DIAGNOSIS — O21.0 MILD HYPEREMESIS GRAVIDARUM, ANTEPARTUM: Primary | ICD-10-CM

## 2024-03-06 LAB
ALBUMIN SERPL-MCNC: 3.7 G/DL (ref 3.4–5)
ALBUMIN/GLOB SERPL: 1.4 {RATIO} (ref 1.1–2.2)
ALP SERPL-CCNC: 43 U/L (ref 40–129)
ALT SERPL-CCNC: 32 U/L (ref 10–40)
ANION GAP SERPL CALCULATED.3IONS-SCNC: 10 MMOL/L (ref 3–16)
AST SERPL-CCNC: 19 U/L (ref 15–37)
BASOPHILS # BLD: 0 K/UL (ref 0–0.2)
BASOPHILS NFR BLD: 0.3 %
BILIRUB SERPL-MCNC: 0.3 MG/DL (ref 0–1)
BILIRUB UR QL STRIP.AUTO: NEGATIVE
BUN SERPL-MCNC: 8 MG/DL (ref 7–20)
CALCIUM SERPL-MCNC: 8.9 MG/DL (ref 8.3–10.6)
CHLORIDE SERPL-SCNC: 105 MMOL/L (ref 99–110)
CLARITY UR: ABNORMAL
CO2 SERPL-SCNC: 21 MMOL/L (ref 21–32)
COLOR UR: YELLOW
CREAT SERPL-MCNC: <0.5 MG/DL (ref 0.6–1.1)
DEPRECATED RDW RBC AUTO: 14.3 % (ref 12.4–15.4)
EOSINOPHIL # BLD: 0.2 K/UL (ref 0–0.6)
EOSINOPHIL NFR BLD: 2.5 %
GFR SERPLBLD CREATININE-BSD FMLA CKD-EPI: >60 ML/MIN/{1.73_M2}
GLUCOSE SERPL-MCNC: 94 MG/DL (ref 70–99)
GLUCOSE UR STRIP.AUTO-MCNC: NEGATIVE MG/DL
HCT VFR BLD AUTO: 32.6 % (ref 36–48)
HGB BLD-MCNC: 11.1 G/DL (ref 12–16)
HGB UR QL STRIP.AUTO: NEGATIVE
KETONES UR STRIP.AUTO-MCNC: NEGATIVE MG/DL
LEUKOCYTE ESTERASE UR QL STRIP.AUTO: NEGATIVE
LIPASE SERPL-CCNC: 33 U/L (ref 13–60)
LYMPHOCYTES # BLD: 1.7 K/UL (ref 1–5.1)
LYMPHOCYTES NFR BLD: 28 %
MCH RBC QN AUTO: 28.9 PG (ref 26–34)
MCHC RBC AUTO-ENTMCNC: 34 G/DL (ref 31–36)
MCV RBC AUTO: 84.9 FL (ref 80–100)
MONOCYTES # BLD: 0.5 K/UL (ref 0–1.3)
MONOCYTES NFR BLD: 8.1 %
NEUTROPHILS # BLD: 3.7 K/UL (ref 1.7–7.7)
NEUTROPHILS NFR BLD: 61.1 %
NITRITE UR QL STRIP.AUTO: NEGATIVE
PH UR STRIP.AUTO: 6.5 [PH] (ref 5–8)
PLATELET # BLD AUTO: 203 K/UL (ref 135–450)
PMV BLD AUTO: 8.1 FL (ref 5–10.5)
POTASSIUM SERPL-SCNC: 4 MMOL/L (ref 3.5–5.1)
PROT SERPL-MCNC: 6.3 G/DL (ref 6.4–8.2)
PROT UR STRIP.AUTO-MCNC: NEGATIVE MG/DL
RBC # BLD AUTO: 3.84 M/UL (ref 4–5.2)
SODIUM SERPL-SCNC: 136 MMOL/L (ref 136–145)
SP GR UR STRIP.AUTO: 1.02 (ref 1–1.03)
UA DIPSTICK W REFLEX MICRO PNL UR: ABNORMAL
URN SPEC COLLECT METH UR: ABNORMAL
UROBILINOGEN UR STRIP-ACNC: 1 E.U./DL
WBC # BLD AUTO: 6.1 K/UL (ref 4–11)

## 2024-03-06 PROCEDURE — 83690 ASSAY OF LIPASE: CPT

## 2024-03-06 PROCEDURE — 85025 COMPLETE CBC W/AUTO DIFF WBC: CPT

## 2024-03-06 PROCEDURE — 6360000002 HC RX W HCPCS: Performed by: EMERGENCY MEDICINE

## 2024-03-06 PROCEDURE — 99284 EMERGENCY DEPT VISIT MOD MDM: CPT

## 2024-03-06 PROCEDURE — 81003 URINALYSIS AUTO W/O SCOPE: CPT

## 2024-03-06 PROCEDURE — 80053 COMPREHEN METABOLIC PANEL: CPT

## 2024-03-06 PROCEDURE — 96374 THER/PROPH/DIAG INJ IV PUSH: CPT

## 2024-03-06 RX ORDER — ONDANSETRON 4 MG/1
4 TABLET, ORALLY DISINTEGRATING ORAL 3 TIMES DAILY PRN
Qty: 21 TABLET | Refills: 0 | Status: SHIPPED | OUTPATIENT
Start: 2024-03-06

## 2024-03-06 RX ORDER — ONDANSETRON 2 MG/ML
4 INJECTION INTRAMUSCULAR; INTRAVENOUS ONCE
Status: COMPLETED | OUTPATIENT
Start: 2024-03-06 | End: 2024-03-06

## 2024-03-06 RX ADMIN — ONDANSETRON 4 MG: 2 INJECTION INTRAMUSCULAR; INTRAVENOUS at 08:08

## 2024-03-06 ASSESSMENT — PAIN SCALES - GENERAL: PAINLEVEL_OUTOF10: 7

## 2024-03-06 ASSESSMENT — PAIN - FUNCTIONAL ASSESSMENT
PAIN_FUNCTIONAL_ASSESSMENT: ACTIVITIES ARE NOT PREVENTED
PAIN_FUNCTIONAL_ASSESSMENT: 0-10

## 2024-03-06 ASSESSMENT — PAIN DESCRIPTION - DESCRIPTORS: DESCRIPTORS: BURNING

## 2024-03-06 ASSESSMENT — PAIN DESCRIPTION - ONSET: ONSET: ON-GOING

## 2024-03-06 ASSESSMENT — PAIN DESCRIPTION - LOCATION: LOCATION: ABDOMEN

## 2024-03-06 ASSESSMENT — PAIN DESCRIPTION - FREQUENCY: FREQUENCY: CONTINUOUS

## 2024-03-06 NOTE — ED PROVIDER NOTES
EMERGENCY DEPARTMENT ENCOUNTER     [facility] EMERGENCY DEPARTMENT     Pt Name: Samuel Vasquez   MRN: [id number]   Birthdate 2000   Date of evaluation: 3/6/2024   Provider: Lavell Ingram II, DO   PCP: Zhou Travis APRN - CNP   Note Started: 8:13 AM EST 3/6/24     CHIEF COMPLAINT     Chief Complaint   Patient presents with    Emesis During Pregnancy     18 weeks concern that there was slight blood in emesis         HISTORY OF PRESENT ILLNESS:  History from : Patient   Limitations to history : None     Samuel Vasquez is a G1, P0 23 y.o. female at approximately 18 weeks gestation who presents to the emergency department complaining of nausea and vomiting.  Patient has reportedly been suffering with hyperemesis gravidarum over the last several months.  She states that she follows with  OB/GYN and has been on and off of Zofran and ODT.  Patient states that she had discontinued Zofran use last week and has had intermittent vomiting/dry heaving over the last several days.  Patient reports small tinge of blood in vomitus today.  Patient denies any significant abdominal pain, fevers, or chills.  No vaginal bleeding or vaginal discharge.    Nursing Notes were all reviewed and agreed with or any disagreements were addressed in the HPI.     ROS: Positives and Pertinent negatives as per HPI.    PAST MEDICAL HISTORY     Past medical history:  has a past medical history of Anxiety.    Past surgical history:  has no past surgical history on file.      PHYSICAL EXAM:  ED Triage Vitals [03/06/24 0754]   BP Temp Temp Source Pulse Respirations SpO2 Height Weight - Scale   112/76 98.2 °F (36.8 °C) Oral 84 16 100 % 1.695 m (5' 6.75\") 108.3 kg (238 lb 11.2 oz)        Physical Exam     General: No acute distress.  Head: Normocephalic/atraumatic.  Heart: Regular rate and rhythm.  Normal S1-S2.  Abdomen: Soft.  Nontender.  Nondistended.  No rebound, or guarding.  No CVA tenderness.    DIAGNOSTIC RESULTS   LABS:   Labs

## 2024-05-28 ENCOUNTER — HOSPITAL ENCOUNTER (EMERGENCY)
Age: 24
Discharge: ANOTHER HOSPITAL | End: 2024-05-28
Attending: EMERGENCY MEDICINE
Payer: COMMERCIAL

## 2024-05-28 VITALS
BODY MASS INDEX: 40.98 KG/M2 | HEIGHT: 66 IN | SYSTOLIC BLOOD PRESSURE: 118 MMHG | HEART RATE: 99 BPM | WEIGHT: 255 LBS | RESPIRATION RATE: 20 BRPM | TEMPERATURE: 98.4 F | OXYGEN SATURATION: 100 % | DIASTOLIC BLOOD PRESSURE: 78 MMHG

## 2024-05-28 DIAGNOSIS — R74.01 TRANSAMINITIS: ICD-10-CM

## 2024-05-28 DIAGNOSIS — O36.8130 DECREASED FETAL MOVEMENTS IN THIRD TRIMESTER, SINGLE OR UNSPECIFIED FETUS: ICD-10-CM

## 2024-05-28 DIAGNOSIS — O21.9 NAUSEA/VOMITING IN PREGNANCY: Primary | ICD-10-CM

## 2024-05-28 LAB
ALBUMIN SERPL-MCNC: 3.6 G/DL (ref 3.4–5)
ALBUMIN/GLOB SERPL: 0.9 {RATIO} (ref 1.1–2.2)
ALP SERPL-CCNC: 86 U/L (ref 40–129)
ALT SERPL-CCNC: 96 U/L (ref 10–40)
ANION GAP SERPL CALCULATED.3IONS-SCNC: 17 MMOL/L (ref 3–16)
AST SERPL-CCNC: 68 U/L (ref 15–37)
BACTERIA URNS QL MICRO: ABNORMAL /HPF
BASOPHILS # BLD: 0 K/UL (ref 0–0.2)
BASOPHILS NFR BLD: 0.2 %
BILIRUB SERPL-MCNC: 0.7 MG/DL (ref 0–1)
BILIRUB UR QL STRIP.AUTO: ABNORMAL
BUN SERPL-MCNC: 10 MG/DL (ref 7–20)
CALCIUM SERPL-MCNC: 8.9 MG/DL (ref 8.3–10.6)
CHLORIDE SERPL-SCNC: 100 MMOL/L (ref 99–110)
CLARITY UR: CLEAR
CO2 SERPL-SCNC: 16 MMOL/L (ref 21–32)
COLOR UR: ABNORMAL
CREAT SERPL-MCNC: <0.5 MG/DL (ref 0.6–1.1)
DEPRECATED RDW RBC AUTO: 13.6 % (ref 12.4–15.4)
EOSINOPHIL # BLD: 0 K/UL (ref 0–0.6)
EOSINOPHIL NFR BLD: 0.1 %
EPI CELLS #/AREA URNS AUTO: 10 /HPF (ref 0–5)
GFR SERPLBLD CREATININE-BSD FMLA CKD-EPI: >90 ML/MIN/{1.73_M2}
GLUCOSE SERPL-MCNC: 112 MG/DL (ref 70–99)
GLUCOSE UR STRIP.AUTO-MCNC: NEGATIVE MG/DL
HCT VFR BLD AUTO: 39.5 % (ref 36–48)
HGB BLD-MCNC: 13 G/DL (ref 12–16)
HGB UR QL STRIP.AUTO: NEGATIVE
HYALINE CASTS #/AREA URNS AUTO: 3 /LPF (ref 0–8)
KETONES UR STRIP.AUTO-MCNC: 80 MG/DL
LEUKOCYTE ESTERASE UR QL STRIP.AUTO: NEGATIVE
LIPASE SERPL-CCNC: 44 U/L (ref 13–60)
LYMPHOCYTES # BLD: 0.8 K/UL (ref 1–5.1)
LYMPHOCYTES NFR BLD: 8.7 %
MCH RBC QN AUTO: 27.5 PG (ref 26–34)
MCHC RBC AUTO-ENTMCNC: 32.8 G/DL (ref 31–36)
MCV RBC AUTO: 83.9 FL (ref 80–100)
MONOCYTES # BLD: 0.2 K/UL (ref 0–1.3)
MONOCYTES NFR BLD: 1.7 %
NEUTROPHILS # BLD: 8.4 K/UL (ref 1.7–7.7)
NEUTROPHILS NFR BLD: 89.3 %
NITRITE UR QL STRIP.AUTO: NEGATIVE
PH UR STRIP.AUTO: 5.5 [PH] (ref 5–8)
PLATELET # BLD AUTO: 220 K/UL (ref 135–450)
PMV BLD AUTO: 9.4 FL (ref 5–10.5)
POTASSIUM SERPL-SCNC: 3.7 MMOL/L (ref 3.5–5.1)
PROT SERPL-MCNC: 7.5 G/DL (ref 6.4–8.2)
PROT UR STRIP.AUTO-MCNC: 100 MG/DL
RBC # BLD AUTO: 4.71 M/UL (ref 4–5.2)
RBC CLUMPS #/AREA URNS AUTO: 3 /HPF (ref 0–4)
SODIUM SERPL-SCNC: 133 MMOL/L (ref 136–145)
SP GR UR STRIP.AUTO: 1.02 (ref 1–1.03)
UA COMPLETE W REFLEX CULTURE PNL UR: ABNORMAL
UA DIPSTICK W REFLEX MICRO PNL UR: YES
URN SPEC COLLECT METH UR: ABNORMAL
UROBILINOGEN UR STRIP-ACNC: 1 E.U./DL
WBC # BLD AUTO: 9.5 K/UL (ref 4–11)
WBC #/AREA URNS AUTO: 7 /HPF (ref 0–5)

## 2024-05-28 PROCEDURE — 83690 ASSAY OF LIPASE: CPT

## 2024-05-28 PROCEDURE — 2580000003 HC RX 258: Performed by: EMERGENCY MEDICINE

## 2024-05-28 PROCEDURE — 96374 THER/PROPH/DIAG INJ IV PUSH: CPT

## 2024-05-28 PROCEDURE — 96361 HYDRATE IV INFUSION ADD-ON: CPT

## 2024-05-28 PROCEDURE — 80053 COMPREHEN METABOLIC PANEL: CPT

## 2024-05-28 PROCEDURE — 96375 TX/PRO/DX INJ NEW DRUG ADDON: CPT

## 2024-05-28 PROCEDURE — 81001 URINALYSIS AUTO W/SCOPE: CPT

## 2024-05-28 PROCEDURE — 6360000002 HC RX W HCPCS: Performed by: EMERGENCY MEDICINE

## 2024-05-28 PROCEDURE — 99285 EMERGENCY DEPT VISIT HI MDM: CPT

## 2024-05-28 PROCEDURE — 85025 COMPLETE CBC W/AUTO DIFF WBC: CPT

## 2024-05-28 RX ORDER — DIPHENHYDRAMINE HYDROCHLORIDE 50 MG/ML
25 INJECTION INTRAMUSCULAR; INTRAVENOUS ONCE
Status: COMPLETED | OUTPATIENT
Start: 2024-05-28 | End: 2024-05-28

## 2024-05-28 RX ORDER — ONDANSETRON 2 MG/ML
4 INJECTION INTRAMUSCULAR; INTRAVENOUS
Status: DISCONTINUED | OUTPATIENT
Start: 2024-05-28 | End: 2024-05-28 | Stop reason: HOSPADM

## 2024-05-28 RX ORDER — 0.9 % SODIUM CHLORIDE 0.9 %
1000 INTRAVENOUS SOLUTION INTRAVENOUS ONCE
Status: COMPLETED | OUTPATIENT
Start: 2024-05-28 | End: 2024-05-28

## 2024-05-28 RX ORDER — METOCLOPRAMIDE HYDROCHLORIDE 5 MG/ML
10 INJECTION INTRAMUSCULAR; INTRAVENOUS ONCE
Status: COMPLETED | OUTPATIENT
Start: 2024-05-28 | End: 2024-05-28

## 2024-05-28 RX ADMIN — ONDANSETRON 4 MG: 2 INJECTION INTRAMUSCULAR; INTRAVENOUS at 08:57

## 2024-05-28 RX ADMIN — METOCLOPRAMIDE 10 MG: 5 INJECTION, SOLUTION INTRAMUSCULAR; INTRAVENOUS at 08:57

## 2024-05-28 RX ADMIN — SODIUM CHLORIDE 1000 ML: 9 INJECTION, SOLUTION INTRAVENOUS at 08:56

## 2024-05-28 RX ADMIN — DIPHENHYDRAMINE HYDROCHLORIDE 25 MG: 50 INJECTION INTRAMUSCULAR; INTRAVENOUS at 08:58

## 2024-05-28 ASSESSMENT — PAIN DESCRIPTION - PAIN TYPE: TYPE: ACUTE PAIN

## 2024-05-28 ASSESSMENT — PAIN - FUNCTIONAL ASSESSMENT: PAIN_FUNCTIONAL_ASSESSMENT: 0-10

## 2024-05-28 ASSESSMENT — PAIN DESCRIPTION - LOCATION: LOCATION: THROAT;ABDOMEN

## 2024-05-28 ASSESSMENT — PAIN SCALES - GENERAL: PAINLEVEL_OUTOF10: 8

## 2024-05-28 NOTE — ED PROVIDER NOTES
created using Dragon dictation software.  Efforts were made by me to ensure accuracy, however some errors may be present due to limitations of this technology and occasionally words are not transcribed correctly.        Mario Lozano MD  05/28/24 7146

## 2025-06-30 ENCOUNTER — HOSPITAL ENCOUNTER (EMERGENCY)
Age: 25
Discharge: HOME | End: 2025-06-30
Payer: COMMERCIAL

## 2025-06-30 VITALS
OXYGEN SATURATION: 97 % | DIASTOLIC BLOOD PRESSURE: 65 MMHG | WEIGHT: 254.4 LBS | RESPIRATION RATE: 16 BRPM | HEART RATE: 77 BPM | TEMPERATURE: 98.6 F | SYSTOLIC BLOOD PRESSURE: 112 MMHG | BODY MASS INDEX: 40.88 KG/M2 | HEIGHT: 66 IN

## 2025-06-30 DIAGNOSIS — K08.89 DENTALGIA: Primary | ICD-10-CM

## 2025-06-30 DIAGNOSIS — J02.9 ACUTE PHARYNGITIS, UNSPECIFIED ETIOLOGY: ICD-10-CM

## 2025-06-30 PROCEDURE — 99283 EMERGENCY DEPT VISIT LOW MDM: CPT

## 2025-06-30 RX ORDER — IBUPROFEN 100 MG/5ML
600 SUSPENSION ORAL EVERY 6 HOURS PRN
Qty: 300 ML | Refills: 1 | Status: SHIPPED | OUTPATIENT
Start: 2025-06-30

## 2025-06-30 RX ORDER — ACETAMINOPHEN 160 MG/5ML
500 LIQUID ORAL EVERY 6 HOURS PRN
Qty: 300 ML | Refills: 0 | Status: SHIPPED | OUTPATIENT
Start: 2025-06-30

## 2025-06-30 RX ORDER — AMOXICILLIN AND CLAVULANATE POTASSIUM 250; 62.5 MG/5ML; MG/5ML
500 POWDER, FOR SUSPENSION ORAL 3 TIMES DAILY
Qty: 300 ML | Refills: 0 | Status: SHIPPED | OUTPATIENT
Start: 2025-06-30 | End: 2025-06-30

## 2025-06-30 RX ORDER — CHLORHEXIDINE GLUCONATE ORAL RINSE 1.2 MG/ML
15 SOLUTION DENTAL 2 TIMES DAILY
Qty: 420 ML | Refills: 0 | Status: SHIPPED | OUTPATIENT
Start: 2025-06-30 | End: 2025-07-14

## 2025-06-30 ASSESSMENT — ENCOUNTER SYMPTOMS
TROUBLE SWALLOWING: 1
SORE THROAT: 1
RHINORRHEA: 0
COLOR CHANGE: 0
COUGH: 0
CONSTIPATION: 0
ABDOMINAL PAIN: 0
BACK PAIN: 0
EYE PAIN: 0
EYE DISCHARGE: 0
VOMITING: 0
CHEST TIGHTNESS: 0
SHORTNESS OF BREATH: 0
FACIAL SWELLING: 0
EYE ITCHING: 0
DIARRHEA: 0
VOICE CHANGE: 0
RESPIRATORY NEGATIVE: 1
ABDOMINAL DISTENTION: 0
SINUS PRESSURE: 0
SINUS PAIN: 0
EYE REDNESS: 0
NAUSEA: 0

## 2025-06-30 ASSESSMENT — PAIN SCALES - GENERAL: PAINLEVEL_OUTOF10: 7

## 2025-06-30 ASSESSMENT — PAIN DESCRIPTION - LOCATION: LOCATION: THROAT

## 2025-06-30 ASSESSMENT — PAIN - FUNCTIONAL ASSESSMENT: PAIN_FUNCTIONAL_ASSESSMENT: 0-10

## 2025-06-30 ASSESSMENT — PAIN DESCRIPTION - DESCRIPTORS: DESCRIPTORS: SORE

## 2025-06-30 ASSESSMENT — LIFESTYLE VARIABLES
HOW OFTEN DO YOU HAVE A DRINK CONTAINING ALCOHOL: NEVER
HOW MANY STANDARD DRINKS CONTAINING ALCOHOL DO YOU HAVE ON A TYPICAL DAY: PATIENT DOES NOT DRINK

## 2025-06-30 NOTE — DISCHARGE INSTRUCTIONS
questions concerning these instructions, call the emergency department at [facility] @ [phone].    Consult your care giver regarding these instructions and seek your physician’s advice regarding medical care.        Dental Referrals  Following is a list of local clinics that treat dental problems.  Many also have specific emergency hours.  For an appointment or for hours of operation, contact the clinic.    General Information    [facility]  [phone]     [facility] [place]  [address]  [place], [place]  [phone]    [facility] (referral agency)  [address] [place] [address], [address]  [place], [place] [id number] [phone] or [phone]  (Application Process, Must Qualify)     [facility] [address] [address] [phone]  ([facility] Medicaid and Insurance with Payment plans for Self-Insured)     Clinics:    [facility] [[email or web address]] Outpatient, [place]   [address] [place] [address] [phone]     [facility] [[email or web address] Outpatient, [place]  [address] [place], [place] [id number] [phone]    [facility]  [address]  [place], [place] [id number] [phone]     [facility] [[email or web address] Outpatient, [place]  [address] [place], [place] [id number] [phone]     [facility] Department Clinics:    [facility]  Appointment only  [address]  [facility], [address] [phone]  [place]  [phone]    [facility]  [address] [place] [address]  [phone]     [facility]  [address] [place], [place] [address] [phone]    [facility]  [address] [place], [place] [address] [phone]      [facility] Department Clinics    [facility]  [address]  [place], [place] [id number] [phone]     [place] [address] [facility],

## 2025-06-30 NOTE — ED PROVIDER NOTES
[facility] EMERGENCY DEPARTMENT  EMERGENCY DEPARTMENT ENCOUNTER        Pt Name: Samuel Vasquez  MRN: [id number]  Birthdate 2000  Date of evaluation: 6/30/2025  Provider: ANAI Osorio  PCP: No primary care provider on file.  Note Started: 12:09 PM EDT 6/30/25      CONNIE. I have evaluated this patient.        CHIEF COMPLAINT       Chief Complaint   Patient presents with    Pharyngitis     Pt arrives from home by herself. Pt C/O sore throat for a couple of days. Pt states that it is painful to eat or drink anything.        HISTORY OF PRESENT ILLNESS: 1 or more Elements     History From: Patient  Limitations to history : None    Samuel Vasquez is a 24 y.o. female with no significant past medical history who presents ED with complaint of a sore throat.  Patient report sore throat for the past couple days.  Reports painful to eat and drink.  Denies drooling.  Denies trismus, stridor or respiratory distress.  No changes in phonation.  Also has some pain to her wisdom teeth passivity of the left lower side.  Does not have a dentist.  No fever or chills.  No headache, rhinorrhea, congestion, cough, sinus pressure/pain or neck pain/stiffness.  Rates pain as a 7/10.  Became concerned and came to the ED for further evaluation and treatment    Nursing Notes were all reviewed and agreed with or any disagreements were addressed in the HPI.    REVIEW OF SYSTEMS :      Review of Systems   Constitutional:  Negative for activity change, appetite change, chills, diaphoresis, fatigue and fever.   HENT:  Positive for dental problem, sore throat and trouble swallowing. Negative for congestion, drooling, ear discharge, ear pain, facial swelling, mouth sores, postnasal drip, rhinorrhea, sinus pressure, sinus pain and voice change.    Eyes:  Negative for pain, discharge, redness and itching.   Respiratory: Negative.  Negative for cough, chest tightness and shortness of breath.    Cardiovascular: Negative.  Negative for

## 2025-09-03 ENCOUNTER — HOSPITAL ENCOUNTER (EMERGENCY)
Age: 25
Discharge: HOME | End: 2025-09-03
Payer: COMMERCIAL

## 2025-09-03 VITALS
RESPIRATION RATE: 14 BRPM | DIASTOLIC BLOOD PRESSURE: 74 MMHG | BODY MASS INDEX: 40.81 KG/M2 | OXYGEN SATURATION: 100 % | WEIGHT: 253.9 LBS | TEMPERATURE: 98.1 F | HEIGHT: 66 IN | HEART RATE: 84 BPM | SYSTOLIC BLOOD PRESSURE: 111 MMHG

## 2025-09-03 DIAGNOSIS — Z20.2 EXPOSURE TO STD: ICD-10-CM

## 2025-09-03 DIAGNOSIS — N89.8 VAGINAL DISCHARGE: Primary | ICD-10-CM

## 2025-09-03 LAB
BACTERIA GENITAL QL WET PREP: NORMAL
BACTERIA URNS QL MICRO: NORMAL /HPF
BILIRUB UR QL STRIP.AUTO: NEGATIVE
CLARITY UR: CLEAR
CLUE CELLS SPEC QL WET PREP: NORMAL
COLOR UR: ABNORMAL
EPI CELLS #/AREA URNS AUTO: 2 /HPF (ref 0–5)
EPI CELLS SPEC QL WET PREP: NORMAL
GLUCOSE UR STRIP.AUTO-MCNC: NEGATIVE MG/DL
HCG SERPL QL: NEGATIVE
HGB UR QL STRIP.AUTO: NEGATIVE
HYALINE CASTS #/AREA URNS AUTO: 1 /LPF (ref 0–8)
KETONES UR STRIP.AUTO-MCNC: ABNORMAL MG/DL
LEUKOCYTE ESTERASE UR QL STRIP.AUTO: ABNORMAL
NITRITE UR QL STRIP.AUTO: NEGATIVE
PH UR STRIP.AUTO: 6.5 [PH] (ref 5–8)
PROT UR STRIP.AUTO-MCNC: ABNORMAL MG/DL
RBC CLUMPS #/AREA URNS AUTO: 2 /HPF (ref 0–4)
RBC SPEC QL WET PREP: NORMAL
SP GR UR STRIP.AUTO: 1.02 (ref 1–1.03)
SPECIMEN SOURCE FLD: NORMAL
T VAGINALIS GENITAL QL WET PREP: NORMAL
UA COMPLETE W REFLEX CULTURE PNL UR: ABNORMAL
UA DIPSTICK W REFLEX MICRO PNL UR: YES
URN SPEC COLLECT METH UR: ABNORMAL
UROBILINOGEN UR STRIP-ACNC: 1 E.U./DL
WBC #/AREA URNS AUTO: 5 /HPF (ref 0–5)
WBC SPEC QL WET PREP: NORMAL
YEAST GENITAL QL WET PREP: NORMAL

## 2025-09-03 PROCEDURE — 86702 HIV-2 ANTIBODY: CPT

## 2025-09-03 PROCEDURE — 87591 N.GONORRHOEAE DNA AMP PROB: CPT

## 2025-09-03 PROCEDURE — 84703 CHORIONIC GONADOTROPIN ASSAY: CPT

## 2025-09-03 PROCEDURE — 86780 TREPONEMA PALLIDUM: CPT

## 2025-09-03 PROCEDURE — 87210 SMEAR WET MOUNT SALINE/INK: CPT

## 2025-09-03 PROCEDURE — 99284 EMERGENCY DEPT VISIT MOD MDM: CPT

## 2025-09-03 PROCEDURE — 81001 URINALYSIS AUTO W/SCOPE: CPT

## 2025-09-03 PROCEDURE — 96372 THER/PROPH/DIAG INJ SC/IM: CPT

## 2025-09-03 PROCEDURE — 87491 CHLMYD TRACH DNA AMP PROBE: CPT

## 2025-09-03 PROCEDURE — 6360000002 HC RX W HCPCS

## 2025-09-03 PROCEDURE — 87390 HIV-1 AG IA: CPT

## 2025-09-03 PROCEDURE — 86701 HIV-1ANTIBODY: CPT

## 2025-09-03 RX ORDER — CEFTRIAXONE 1 G/1
500 INJECTION, POWDER, FOR SOLUTION INTRAMUSCULAR; INTRAVENOUS ONCE
Status: DISCONTINUED | OUTPATIENT
Start: 2025-09-03 | End: 2025-09-03 | Stop reason: SDUPTHER

## 2025-09-03 RX ORDER — DOXYCYCLINE HYCLATE 100 MG
100 TABLET ORAL 2 TIMES DAILY
Qty: 14 TABLET | Refills: 0 | Status: SHIPPED | OUTPATIENT
Start: 2025-09-03 | End: 2025-09-10

## 2025-09-03 RX ORDER — CEFTRIAXONE 500 MG/1
500 INJECTION, POWDER, FOR SOLUTION INTRAMUSCULAR; INTRAVENOUS ONCE
Status: COMPLETED | OUTPATIENT
Start: 2025-09-03 | End: 2025-09-03

## 2025-09-03 RX ORDER — WATER 10 ML/10ML
INJECTION INTRAMUSCULAR; INTRAVENOUS; SUBCUTANEOUS
Status: DISCONTINUED
Start: 2025-09-03 | End: 2025-09-03 | Stop reason: HOSPADM

## 2025-09-03 RX ADMIN — CEFTRIAXONE SODIUM 500 MG: 500 INJECTION, POWDER, FOR SOLUTION INTRAMUSCULAR; INTRAVENOUS at 13:34

## 2025-09-03 ASSESSMENT — LIFESTYLE VARIABLES
HOW MANY STANDARD DRINKS CONTAINING ALCOHOL DO YOU HAVE ON A TYPICAL DAY: PATIENT DOES NOT DRINK
HOW OFTEN DO YOU HAVE A DRINK CONTAINING ALCOHOL: NEVER

## 2025-09-03 ASSESSMENT — PAIN - FUNCTIONAL ASSESSMENT: PAIN_FUNCTIONAL_ASSESSMENT: 0-10

## 2025-09-04 LAB
C TRACH DNA CVX QL NAA+PROBE: NEGATIVE
HIV 1+2 AB+HIV1 P24 AG SERPL QL IA: NORMAL
HIV 2 AB SERPL QL IA: NORMAL
HIV1 AB SERPL QL IA: NORMAL
HIV1 P24 AG SERPL QL IA: NORMAL
N GONORRHOEA DNA CERV MUCUS QL NAA+PROBE: NEGATIVE
REAGIN+T PALLIDUM IGG+IGM SERPL-IMP: NORMAL